# Patient Record
Sex: MALE | Race: WHITE | NOT HISPANIC OR LATINO
[De-identification: names, ages, dates, MRNs, and addresses within clinical notes are randomized per-mention and may not be internally consistent; named-entity substitution may affect disease eponyms.]

---

## 2017-01-04 ENCOUNTER — PRIOR ORIGINAL RECORDS (OUTPATIENT)
Dept: OTHER | Age: 62
End: 2017-01-04

## 2017-01-09 ENCOUNTER — HOSPITAL ENCOUNTER (OUTPATIENT)
Dept: CV DIAGNOSTICS | Facility: HOSPITAL | Age: 62
Discharge: HOME | End: 2017-01-09
Attending: INTERNAL MEDICINE
Payer: COMMERCIAL

## 2017-01-09 DIAGNOSIS — I48.91 A-FIB (HCC): ICD-10-CM

## 2017-01-09 PROCEDURE — 93226 XTRNL ECG REC<48 HR SCAN A/R: CPT

## 2017-01-09 PROCEDURE — 93225 XTRNL ECG REC<48 HRS REC: CPT

## 2017-01-09 PROCEDURE — 93227 XTRNL ECG REC<48 HR R&I: CPT | Performed by: INTERNAL MEDICINE

## 2017-01-16 ENCOUNTER — APPOINTMENT (OUTPATIENT)
Dept: GENERAL RADIOLOGY | Facility: HOSPITAL | Age: 62
End: 2017-01-16
Attending: EMERGENCY MEDICINE
Payer: COMMERCIAL

## 2017-01-16 ENCOUNTER — HOSPITAL ENCOUNTER (EMERGENCY)
Facility: HOSPITAL | Age: 62
Discharge: HOME | End: 2017-01-16
Attending: EMERGENCY MEDICINE
Payer: COMMERCIAL

## 2017-01-16 ENCOUNTER — PRIOR ORIGINAL RECORDS (OUTPATIENT)
Dept: OTHER | Age: 62
End: 2017-01-16

## 2017-01-16 VITALS
DIASTOLIC BLOOD PRESSURE: 68 MMHG | HEART RATE: 55 BPM | TEMPERATURE: 98 F | SYSTOLIC BLOOD PRESSURE: 114 MMHG | BODY MASS INDEX: 25.33 KG/M2 | OXYGEN SATURATION: 97 % | HEIGHT: 76 IN | WEIGHT: 208 LBS | RESPIRATION RATE: 16 BRPM

## 2017-01-16 DIAGNOSIS — E83.39 HYPOPHOSPHATEMIA: Primary | ICD-10-CM

## 2017-01-16 DIAGNOSIS — R53.1 WEAKNESS GENERALIZED: ICD-10-CM

## 2017-01-16 LAB
ALBUMIN SERPL-MCNC: 3.9 G/DL (ref 3.5–4.8)
ALP LIVER SERPL-CCNC: 74 U/L (ref 45–117)
ALT SERPL-CCNC: 25 U/L (ref 17–63)
AST SERPL-CCNC: 19 U/L (ref 15–41)
BASOPHILS # BLD AUTO: 0.05 X10(3) UL (ref 0–0.1)
BASOPHILS NFR BLD AUTO: 0.8 %
BILIRUB SERPL-MCNC: 0.5 MG/DL (ref 0.1–2)
BILIRUB UR QL STRIP.AUTO: NEGATIVE
BUN BLD-MCNC: 23 MG/DL (ref 8–20)
CALCIUM BLD-MCNC: 9 MG/DL (ref 8.3–10.3)
CHLORIDE: 107 MMOL/L (ref 101–111)
CLARITY UR REFRACT.AUTO: CLEAR
CO2: 24 MMOL/L (ref 22–32)
COLOR UR AUTO: YELLOW
CREAT BLD-MCNC: 1.27 MG/DL (ref 0.7–1.3)
EOSINOPHIL # BLD AUTO: 0.13 X10(3) UL (ref 0–0.3)
EOSINOPHIL NFR BLD AUTO: 2.1 %
ERYTHROCYTE [DISTWIDTH] IN BLOOD BY AUTOMATED COUNT: 12.2 % (ref 11.5–16)
GLUCOSE BLD-MCNC: 96 MG/DL (ref 70–99)
GLUCOSE UR STRIP.AUTO-MCNC: NEGATIVE MG/DL
HAV IGM SER QL: 2.2 MG/DL (ref 1.7–3)
HCT VFR BLD AUTO: 44.4 % (ref 37–53)
HGB BLD-MCNC: 16 G/DL (ref 13–17)
IMMATURE GRANULOCYTE COUNT: 0.03 X10(3) UL (ref 0–1)
IMMATURE GRANULOCYTE RATIO %: 0.5 %
KETONES UR STRIP.AUTO-MCNC: NEGATIVE MG/DL
LEUKOCYTE ESTERASE UR QL STRIP.AUTO: NEGATIVE
LYMPHOCYTES # BLD AUTO: 1.8 X10(3) UL (ref 0.9–4)
LYMPHOCYTES NFR BLD AUTO: 29.5 %
M PROTEIN MFR SERPL ELPH: 6.7 G/DL (ref 6.1–8.3)
MCH RBC QN AUTO: 33.8 PG (ref 27–33.2)
MCHC RBC AUTO-ENTMCNC: 36 G/DL (ref 31–37)
MCV RBC AUTO: 93.9 FL (ref 80–99)
MONOCYTES # BLD AUTO: 0.44 X10(3) UL (ref 0.1–0.6)
MONOCYTES NFR BLD AUTO: 7.2 %
NEUTROPHIL ABS PRELIM: 3.65 X10 (3) UL (ref 1.3–6.7)
NEUTROPHILS # BLD AUTO: 3.65 X10(3) UL (ref 1.3–6.7)
NEUTROPHILS NFR BLD AUTO: 59.9 %
NITRITE UR QL STRIP.AUTO: NEGATIVE
PH UR STRIP.AUTO: 7 [PH] (ref 4.5–8)
PHOSPHATE SERPL-MCNC: 2.3 MG/DL (ref 2.5–4.9)
PLATELET # BLD AUTO: 184 10(3)UL (ref 150–450)
POTASSIUM SERPL-SCNC: 4 MMOL/L (ref 3.6–5.1)
PROT UR STRIP.AUTO-MCNC: NEGATIVE MG/DL
RBC # BLD AUTO: 4.73 X10(6)UL (ref 4.3–5.7)
RBC UR QL AUTO: NEGATIVE
RED CELL DISTRIBUTION WIDTH-SD: 42.3 FL (ref 35.1–46.3)
SODIUM SERPL-SCNC: 141 MMOL/L (ref 136–144)
SP GR UR STRIP.AUTO: 1.01 (ref 1–1.03)
TROPONIN: <0.046 NG/ML (ref ?–0.05)
TSI SER-ACNC: 3.83 MIU/ML (ref 0.35–5.5)
UROBILINOGEN UR STRIP.AUTO-MCNC: <2 MG/DL
WBC # BLD AUTO: 6.1 X10(3) UL (ref 4–13)

## 2017-01-16 PROCEDURE — 93010 ELECTROCARDIOGRAM REPORT: CPT

## 2017-01-16 PROCEDURE — 96360 HYDRATION IV INFUSION INIT: CPT

## 2017-01-16 PROCEDURE — 83735 ASSAY OF MAGNESIUM: CPT | Performed by: EMERGENCY MEDICINE

## 2017-01-16 PROCEDURE — 84100 ASSAY OF PHOSPHORUS: CPT | Performed by: EMERGENCY MEDICINE

## 2017-01-16 PROCEDURE — 81003 URINALYSIS AUTO W/O SCOPE: CPT | Performed by: EMERGENCY MEDICINE

## 2017-01-16 PROCEDURE — 85025 COMPLETE CBC W/AUTO DIFF WBC: CPT

## 2017-01-16 PROCEDURE — 84484 ASSAY OF TROPONIN QUANT: CPT

## 2017-01-16 PROCEDURE — 99285 EMERGENCY DEPT VISIT HI MDM: CPT

## 2017-01-16 PROCEDURE — 80053 COMPREHEN METABOLIC PANEL: CPT

## 2017-01-16 PROCEDURE — 93005 ELECTROCARDIOGRAM TRACING: CPT

## 2017-01-16 PROCEDURE — 84443 ASSAY THYROID STIM HORMONE: CPT | Performed by: EMERGENCY MEDICINE

## 2017-01-16 PROCEDURE — 71010 XR CHEST AP PORTABLE  (CPT=71010): CPT

## 2017-01-17 ENCOUNTER — OFFICE VISIT (OUTPATIENT)
Dept: INTERNAL MEDICINE CLINIC | Facility: CLINIC | Age: 62
End: 2017-01-17

## 2017-01-17 ENCOUNTER — PRIOR ORIGINAL RECORDS (OUTPATIENT)
Dept: OTHER | Age: 62
End: 2017-01-17

## 2017-01-17 VITALS
HEIGHT: 75 IN | DIASTOLIC BLOOD PRESSURE: 60 MMHG | HEART RATE: 57 BPM | SYSTOLIC BLOOD PRESSURE: 104 MMHG | TEMPERATURE: 98 F | RESPIRATION RATE: 16 BRPM | BODY MASS INDEX: 27.1 KG/M2 | WEIGHT: 218 LBS | OXYGEN SATURATION: 98 %

## 2017-01-17 DIAGNOSIS — R53.1 GENERALIZED WEAKNESS: ICD-10-CM

## 2017-01-17 DIAGNOSIS — I48.0 PAROXYSMAL A-FIB (HCC): Primary | ICD-10-CM

## 2017-01-17 DIAGNOSIS — F41.9 ANXIETY: ICD-10-CM

## 2017-01-17 DIAGNOSIS — Z82.49 FAMILY HISTORY OF ABDOMINAL AORTIC ANEURYSM (AAA): ICD-10-CM

## 2017-01-17 LAB
ATRIAL RATE: 57 BPM
P AXIS: 53 DEGREES
P-R INTERVAL: 160 MS
Q-T INTERVAL: 402 MS
QRS DURATION: 88 MS
QTC CALCULATION (BEZET): 391 MS
R AXIS: 90 DEGREES
T AXIS: 66 DEGREES
VENTRICULAR RATE: 57 BPM

## 2017-01-17 PROCEDURE — 99214 OFFICE O/P EST MOD 30 MIN: CPT | Performed by: PHYSICIAN ASSISTANT

## 2017-01-17 RX ORDER — ALPRAZOLAM 0.25 MG/1
0.25 TABLET ORAL 2 TIMES DAILY PRN
Qty: 30 TABLET | Refills: 0 | Status: SHIPPED | OUTPATIENT
Start: 2017-01-17 | End: 2017-04-11 | Stop reason: ALTCHOICE

## 2017-01-17 NOTE — ED PROVIDER NOTES
Patient Seen in: [facility] Emergency Department    History   Patient presents with:  Fatigue (constitutional, neurologic)    Stated Complaint: fatigue/weakness    HPI    Patient is a 42-year-old male comes in to emergency room for evaluation of gen 3/2013   • Elevated prostate specific antigen (PSA) 2011   • History of COPD    • Cholelithiasis 5/2011   • Diverticulosis 5/2011   • Renal cyst 5/2011     tiny Lt upper pole   • Splenic cyst 5/2011     lower pole splenic cyst   • Tendinopathy of left rota • Arthritis Sister      Rheumatoid     • Breast Cancer Maternal Aunt 39   • Breast Cancer Other      maternal uncle's daughter - early 52's, unlateral mastectomy         Smoking Status: Never Smoker                      Alcohol Use: Yes           0.0 oz/ extremities. No pronator drift lower extremities.     ED Course     Labs Reviewed   COMP METABOLIC PANEL (14) - Abnormal; Notable for the following:     BUN 23 (*)     All other components within normal limits   PHOSPHORUS - Abnormal; Notable for the follo evidence clinically for stroke. Patient was screened and evaluated during this visit.    As a treating physician attending to the patient, I determined, within reasonable clinical confidence and prior to discharge, that an emergency medical condition was n

## 2017-01-17 NOTE — PROGRESS NOTES
HPI:    Patient ID: Brittany Hudson is a 60year old male. HPI  Pt has had 2 episodes in the past week of dizziness/weakness, feels like 'he doesn't want to stand up'. No syncope. Episodes occurred 1/12 and 1/16.  They occurred while at rest, in the e times daily. Disp:  Rfl:    aspirin 81 MG Oral Tab Take 81 mg by mouth daily. Disp:  Rfl:    Multiple Vitamin (DAILY MARTÍNEZ) Oral Tab Take 1 tablet by mouth daily.  Disp:  Rfl:      Allergies:No Known Allergies   PHYSICAL EXAM:   Physical Exam   Constitutio This Visit:  Signed Prescriptions Disp Refills    alprazolam 0.25 MG Oral Tab 30 tablet 0      Sig: Take 1 tablet (0.25 mg total) by mouth 2 (two) times daily as needed for Anxiety.            Imaging & Referrals:  None       IF#[id number]

## 2017-01-17 NOTE — PATIENT INSTRUCTIONS
Check with Dr. Oj Christina if he would recommend further workup for your symptoms at this time  Take alprazolam, 1/2 to 1 tablet, as needed for anxiety

## 2017-01-17 NOTE — ED INITIAL ASSESSMENT (HPI)
Pt with reports of not feeling right for past few days. Denies specific pain, hx of afib. Denies shortness of breath, recent illness.

## 2017-02-06 ENCOUNTER — PRIOR ORIGINAL RECORDS (OUTPATIENT)
Dept: OTHER | Age: 62
End: 2017-02-06

## 2017-02-06 ENCOUNTER — OFFICE VISIT (OUTPATIENT)
Dept: INTERNAL MEDICINE CLINIC | Facility: CLINIC | Age: 62
End: 2017-02-06

## 2017-02-06 VITALS
DIASTOLIC BLOOD PRESSURE: 62 MMHG | BODY MASS INDEX: 27.35 KG/M2 | WEIGHT: 220 LBS | HEART RATE: 64 BPM | RESPIRATION RATE: 12 BRPM | HEIGHT: 75 IN | SYSTOLIC BLOOD PRESSURE: 106 MMHG | TEMPERATURE: 98 F

## 2017-02-06 DIAGNOSIS — G89.29 CHRONIC LEFT SI JOINT PAIN: Primary | ICD-10-CM

## 2017-02-06 DIAGNOSIS — M53.3 CHRONIC LEFT SI JOINT PAIN: Primary | ICD-10-CM

## 2017-02-06 PROCEDURE — 99213 OFFICE O/P EST LOW 20 MIN: CPT | Performed by: PHYSICIAN ASSISTANT

## 2017-02-06 RX ORDER — METHYLPREDNISOLONE 4 MG/1
TABLET ORAL
Qty: 1 PACKAGE | Refills: 0 | Status: SHIPPED | OUTPATIENT
Start: 2017-02-06 | End: 2017-03-25 | Stop reason: ALTCHOICE

## 2017-02-06 RX ORDER — CYCLOBENZAPRINE HCL 10 MG
10 TABLET ORAL 2 TIMES DAILY PRN
Qty: 20 TABLET | Refills: 0 | Status: SHIPPED | OUTPATIENT
Start: 2017-02-06 | End: 2017-04-11 | Stop reason: ALTCHOICE

## 2017-02-06 NOTE — PROGRESS NOTES
HPI:   Veena Flores is a 60year old male who is here for complaints of back pain. Pain is located at left low back. Pain is described as dull, aching, shooting. Severity is moderate. The pain radiates to no radiation of pain.  Pain was precipitated granuloma of skin and subcutaneous tissue 2/2003     Lt neck   • Toe fracture, right 1/1999     fracture of lateral aspect of base of distal phalanges of Rt great toe   • Internal hemorrhoids 5/2004   • Sleep apnea            Past Surgical History    APPEN nourished,in no apparent distress  SKIN: no rashes,no suspicious lesions  NECK: supple,no adenopathy,no bruits  LUNGS: clear to auscultation  CARDIO: RRR without murmur  GI: good BS's,no masses, HSM or tenderness  EXTREMITIES: no cyanosis, clubbing or grisel

## 2017-02-10 ENCOUNTER — MED REC SCAN ONLY (OUTPATIENT)
Dept: INTERNAL MEDICINE CLINIC | Facility: CLINIC | Age: 62
End: 2017-02-10

## 2017-02-15 ENCOUNTER — HOSPITAL ENCOUNTER (OUTPATIENT)
Dept: CV DIAGNOSTICS | Facility: HOSPITAL | Age: 62
Discharge: HOME | End: 2017-02-15
Attending: INTERNAL MEDICINE
Payer: COMMERCIAL

## 2017-02-15 DIAGNOSIS — I48.91 A-FIB (HCC): ICD-10-CM

## 2017-02-15 PROCEDURE — 93017 CV STRESS TEST TRACING ONLY: CPT

## 2017-02-15 PROCEDURE — 93350 STRESS TTE ONLY: CPT | Performed by: INTERNAL MEDICINE

## 2017-02-15 PROCEDURE — 93350 STRESS TTE ONLY: CPT

## 2017-02-15 PROCEDURE — 93018 CV STRESS TEST I&R ONLY: CPT | Performed by: INTERNAL MEDICINE

## 2017-02-17 ENCOUNTER — PRIOR ORIGINAL RECORDS (OUTPATIENT)
Dept: OTHER | Age: 62
End: 2017-02-17

## 2017-03-06 LAB
HEMATOCRIT: 44.4 %
HEMOGLOBIN: 16 G/DL
MCH: 33.8 PG
MCHC: 36 G/DL
MCV: 93.9 FL
PLATELETS: 184 K/UL
RED BLOOD COUNT: 4.73 X 10-6/U
WHITE BLOOD COUNT: 6.1 X 10-3/U

## 2017-03-07 LAB
ALBUMIN: 3.9 G/DL
BILIRUBIN TOTAL: 0.5 MG/DL
BUN: 23 MG/DL
CALCIUM: 9 MG/DL
CHLORIDE: 107 MEQ/L
CREATININE, SERUM: 1.27 MG/DL
GLUCOSE: 96 MG/DL
MAGNESIUM: 2.2 MG/DL
POTASSIUM, SERUM: 4 MEQ/L
PROTEIN, TOTAL: 6.7 G/DL
SGOT (AST): 19 IU/L
SGPT (ALT): 25 IU/L
SODIUM: 141 MEQ/L

## 2017-03-17 ENCOUNTER — PRIOR ORIGINAL RECORDS (OUTPATIENT)
Dept: OTHER | Age: 62
End: 2017-03-17

## 2017-03-25 ENCOUNTER — OFFICE VISIT (OUTPATIENT)
Dept: INTERNAL MEDICINE CLINIC | Facility: CLINIC | Age: 62
End: 2017-03-25

## 2017-03-25 VITALS
BODY MASS INDEX: 26.49 KG/M2 | TEMPERATURE: 98 F | WEIGHT: 213 LBS | SYSTOLIC BLOOD PRESSURE: 100 MMHG | HEIGHT: 75 IN | DIASTOLIC BLOOD PRESSURE: 60 MMHG | RESPIRATION RATE: 12 BRPM | HEART RATE: 72 BPM

## 2017-03-25 DIAGNOSIS — M53.3 CHRONIC LEFT SI JOINT PAIN: ICD-10-CM

## 2017-03-25 DIAGNOSIS — M62.830 LUMBAR PARASPINAL MUSCLE SPASM: Primary | ICD-10-CM

## 2017-03-25 DIAGNOSIS — G89.29 CHRONIC LEFT SI JOINT PAIN: ICD-10-CM

## 2017-03-25 PROCEDURE — 99213 OFFICE O/P EST LOW 20 MIN: CPT | Performed by: PHYSICIAN ASSISTANT

## 2017-03-25 RX ORDER — PANTOPRAZOLE SODIUM 40 MG/1
40 TABLET, DELAYED RELEASE ORAL
COMMUNITY
Start: 2017-03-24 | End: 2017-06-13

## 2017-03-25 NOTE — PROGRESS NOTES
HPI:   Puma Michel is a 60year old male who is here for complaints of back pain, recurrent. Pain is located at left low back. Pain is described as dull, aching, shooting. Severity is mild. The pain radiates to no radiation of pain.  Pain was precip Tendinopathy of left rotator cuff 2007   • Thoracic outlet syndrome 2009   • Environmental allergies      pollen, dust, trees, grass   • Chronic hypotension 11/2011     typically asymptomatic   • EIC (epidermal inclusion cyst) 2003     L upper back s/p exc lesions  LUNGS: denies shortness of breath with exertion  CARDIOVASCULAR: denies chest pain on exertion  GI: denies abdominal pain,denies heartburn  MUSCULOSKELETAL: no other joints are affected    EXAM:   /60 mmHg  Pulse 72  Temp(Src) 97.8 °F (36.6

## 2017-03-26 PROBLEM — G89.29 CHRONIC LEFT SI JOINT PAIN: Status: ACTIVE | Noted: 2017-03-26

## 2017-03-26 PROBLEM — M53.3 CHRONIC LEFT SI JOINT PAIN: Status: ACTIVE | Noted: 2017-03-26

## 2017-03-26 NOTE — PATIENT INSTRUCTIONS
Continue physical therapy  Take cyclobenzaprine if needed for muscle spasm  We will plan to check imaging on your back if not resolved within the next few weeks.

## 2017-04-05 ENCOUNTER — PRIOR ORIGINAL RECORDS (OUTPATIENT)
Dept: OTHER | Age: 62
End: 2017-04-05

## 2017-04-06 ENCOUNTER — HOSPITAL ENCOUNTER (OUTPATIENT)
Dept: CT IMAGING | Facility: HOSPITAL | Age: 62
Discharge: HOME | End: 2017-04-06
Attending: INTERNAL MEDICINE
Payer: COMMERCIAL

## 2017-04-06 DIAGNOSIS — I48.92 ATRIAL FLUTTER (HCC): ICD-10-CM

## 2017-04-06 DIAGNOSIS — I48.91 AFIB (HCC): ICD-10-CM

## 2017-04-06 PROCEDURE — 75572 CT HRT W/3D IMAGE: CPT

## 2017-04-09 ENCOUNTER — HOSPITAL ENCOUNTER (EMERGENCY)
Facility: HOSPITAL | Age: 62
Discharge: HOME | End: 2017-04-09
Attending: EMERGENCY MEDICINE
Payer: COMMERCIAL

## 2017-04-09 ENCOUNTER — PRIOR ORIGINAL RECORDS (OUTPATIENT)
Dept: OTHER | Age: 62
End: 2017-04-09

## 2017-04-09 VITALS
HEART RATE: 56 BPM | DIASTOLIC BLOOD PRESSURE: 63 MMHG | TEMPERATURE: 98 F | WEIGHT: 205 LBS | HEIGHT: 76 IN | RESPIRATION RATE: 18 BRPM | BODY MASS INDEX: 24.96 KG/M2 | OXYGEN SATURATION: 97 % | SYSTOLIC BLOOD PRESSURE: 102 MMHG

## 2017-04-09 DIAGNOSIS — I48.91 ATRIAL FIBRILLATION WITH RAPID VENTRICULAR RESPONSE (HCC): Primary | ICD-10-CM

## 2017-04-09 PROCEDURE — 92960 CARDIOVERSION ELECTRIC EXT: CPT

## 2017-04-09 PROCEDURE — 93005 ELECTROCARDIOGRAM TRACING: CPT

## 2017-04-09 PROCEDURE — 93010 ELECTROCARDIOGRAM REPORT: CPT

## 2017-04-09 PROCEDURE — 80053 COMPREHEN METABOLIC PANEL: CPT | Performed by: EMERGENCY MEDICINE

## 2017-04-09 PROCEDURE — 99152 MOD SED SAME PHYS/QHP 5/>YRS: CPT

## 2017-04-09 PROCEDURE — 99285 EMERGENCY DEPT VISIT HI MDM: CPT

## 2017-04-09 PROCEDURE — 99291 CRITICAL CARE FIRST HOUR: CPT

## 2017-04-09 PROCEDURE — 83735 ASSAY OF MAGNESIUM: CPT | Performed by: EMERGENCY MEDICINE

## 2017-04-09 PROCEDURE — 85025 COMPLETE CBC W/AUTO DIFF WBC: CPT | Performed by: EMERGENCY MEDICINE

## 2017-04-09 PROCEDURE — 84484 ASSAY OF TROPONIN QUANT: CPT | Performed by: EMERGENCY MEDICINE

## 2017-04-09 NOTE — ED INITIAL ASSESSMENT (HPI)
Pt here for irregular HR, the sensation waking him up at midnight.  (Pt has hx of afib; he's also scheduled for an ablation on 4/13.)

## 2017-04-09 NOTE — ED PROVIDER NOTES
Patient Seen in: [facility] Emergency Department    History   Patient presents with:  Arrythmia/Palpitations (cardiovascular)    Stated Complaint: possible afib    HPI    Patient is a 13-year-old male comes in emergency room for evaluation of palpit to cecum    OTHER SURGICAL HISTORY  6/29/2011    Comment robotic-assisted laparoscopic raidcal prostatectomy & B/L pelvic lymph node dissection    ELECTROCARDIOGRAM, COMPLETE  12/6/2013    CARDIOVERSION  12/2011    OTHER SURGICAL HISTORY  3/6/2003    Comme agreed except as otherwise stated in HPI.     Physical Exam     ED Triage Vitals   BP 04/09/17 0105 90/61 mmHg   Pulse 04/09/17 0105 124   Resp 04/09/17 0105 18   Temp 04/09/17 0105 97.8 °F (36.6 °C)   Temp src 04/09/17 0105 Temporal   SpO2 04/09/17 0105 97 Status                     ---------                               -----------         ------                     CBC W/ DIFFERENTIAL[[id number]]          Abnormal            Final result                 Please view results for these tests on the individu visit.     Follow-up:  Lanre Chaparro MD  [address] [id number]  [phone]    In 4 days        Medications Prescribed:  Current Discharge Medication List

## 2017-04-09 NOTE — ED NOTES
Walked patient around unit, no compliant of dizziness or lightheaded. Felt better and felt good to go home.

## 2017-04-10 NOTE — HISTORICAL OFFICE NOTE
Juan Dexter  : 1955  ACCOUNT:  [id number]  [phone]  PCP: Dr. Tamica Bragg     TODAY'S DATE: 2017  DICTATED BY:  Skye Mattson M.D. ]    CHIEF COMPLAINT: [Followup of Atrial fibrillation.]    HPI:    [On 2017, angie Garvey NEURO: no slurred speech, seizures, or localized weakness. HEM/LYMPH: denies easy bruising. ALL: no new food or enviornmental allergies.       PAST HISTORY: COPD, Hx prostate cancer, appendectomy and prostatectomy    PAST CV HISTORY: atrial fibrillation, at life-threatening complication. After reviewing these details, he would like to talk it over with his wife, consider scheduling something towards the end of March, beginning of April.     PLAN:    [1. Schedule EP study and cryoablation for atrial fibrillatio

## 2017-04-12 ENCOUNTER — PRIOR ORIGINAL RECORDS (OUTPATIENT)
Dept: OTHER | Age: 62
End: 2017-04-12

## 2017-04-13 ENCOUNTER — HOSPITAL ENCOUNTER (OUTPATIENT)
Dept: INTERVENTIONAL RADIOLOGY/VASCULAR | Facility: HOSPITAL | Age: 62
Setting detail: OBSERVATION
Discharge: HOME | End: 2017-04-14
Attending: INTERNAL MEDICINE | Admitting: INTERNAL MEDICINE
Payer: COMMERCIAL

## 2017-04-13 DIAGNOSIS — I48.91 A-FIB (HCC): ICD-10-CM

## 2017-04-13 DIAGNOSIS — I48.92 ATRIAL FLUTTER (HCC): ICD-10-CM

## 2017-04-13 PROCEDURE — 93662 INTRACARDIAC ECG (ICE): CPT | Performed by: INTERNAL MEDICINE

## 2017-04-13 PROCEDURE — 4A023FZ MEASUREMENT OF CARDIAC RHYTHM, PERCUTANEOUS APPROACH: ICD-10-PCS | Performed by: INTERNAL MEDICINE

## 2017-04-13 PROCEDURE — 85347 COAGULATION TIME ACTIVATED: CPT

## 2017-04-13 PROCEDURE — 99152 MOD SED SAME PHYS/QHP 5/>YRS: CPT | Performed by: INTERNAL MEDICINE

## 2017-04-13 PROCEDURE — 84132 ASSAY OF SERUM POTASSIUM: CPT | Performed by: NURSE PRACTITIONER

## 2017-04-13 PROCEDURE — 93656 COMPRE EP EVAL ABLTJ ATR FIB: CPT | Performed by: INTERNAL MEDICINE

## 2017-04-13 PROCEDURE — 93613 INTRACARDIAC EPHYS 3D MAPG: CPT | Performed by: INTERNAL MEDICINE

## 2017-04-13 PROCEDURE — B24CZZZ ULTRASONOGRAPHY OF PERICARDIUM: ICD-10-PCS | Performed by: INTERNAL MEDICINE

## 2017-04-13 PROCEDURE — 4A0234Z MEASUREMENT OF CARDIAC ELECTRICAL ACTIVITY, PERCUTANEOUS APPROACH: ICD-10-PCS | Performed by: INTERNAL MEDICINE

## 2017-04-13 PROCEDURE — 99153 MOD SED SAME PHYS/QHP EA: CPT | Performed by: INTERNAL MEDICINE

## 2017-04-13 PROCEDURE — 93010 ELECTROCARDIOGRAM REPORT: CPT | Performed by: INTERNAL MEDICINE

## 2017-04-13 PROCEDURE — 02583ZZ DESTRUCTION OF CONDUCTION MECHANISM, PERCUTANEOUS APPROACH: ICD-10-PCS | Performed by: INTERNAL MEDICINE

## 2017-04-13 PROCEDURE — 93005 ELECTROCARDIOGRAM TRACING: CPT

## 2017-04-13 PROCEDURE — 93655 ICAR CATH ABLTJ DSCRT ARRHYT: CPT | Performed by: INTERNAL MEDICINE

## 2017-04-13 PROCEDURE — 02K83ZZ MAP CONDUCTION MECHANISM, PERCUTANEOUS APPROACH: ICD-10-PCS | Performed by: INTERNAL MEDICINE

## 2017-04-13 RX ORDER — HEPARIN SODIUM 5000 [USP'U]/ML
INJECTION, SOLUTION INTRAVENOUS; SUBCUTANEOUS
Status: COMPLETED
Start: 2017-04-13 | End: 2017-04-13

## 2017-04-13 RX ORDER — MIDAZOLAM HYDROCHLORIDE 1 MG/ML
INJECTION INTRAMUSCULAR; INTRAVENOUS
Status: COMPLETED
Start: 2017-04-13 | End: 2017-04-13

## 2017-04-13 RX ORDER — PANTOPRAZOLE SODIUM 40 MG/1
40 TABLET, DELAYED RELEASE ORAL
Status: DISCONTINUED | OUTPATIENT
Start: 2017-04-14 | End: 2017-04-14

## 2017-04-13 RX ORDER — ATORVASTATIN CALCIUM 20 MG/1
20 TABLET, FILM COATED ORAL NIGHTLY
Status: DISCONTINUED | OUTPATIENT
Start: 2017-04-13 | End: 2017-04-14

## 2017-04-13 RX ORDER — ATORVASTATIN CALCIUM 20 MG/1
20 TABLET, FILM COATED ORAL NIGHTLY
COMMUNITY

## 2017-04-13 RX ORDER — SODIUM CHLORIDE 9 MG/ML
INJECTION, SOLUTION INTRAVENOUS CONTINUOUS
Status: CANCELLED | OUTPATIENT
Start: 2017-04-13

## 2017-04-13 RX ORDER — ASPIRIN 325 MG
325 TABLET, DELAYED RELEASE (ENTERIC COATED) ORAL DAILY
Status: CANCELLED | OUTPATIENT
Start: 2017-04-13

## 2017-04-13 RX ORDER — HEPARIN SODIUM 1000 [USP'U]/ML
INJECTION, SOLUTION INTRAVENOUS; SUBCUTANEOUS
Status: COMPLETED
Start: 2017-04-13 | End: 2017-04-13

## 2017-04-13 RX ORDER — PANTOPRAZOLE SODIUM 40 MG/1
40 TABLET, DELAYED RELEASE ORAL
Status: CANCELLED | OUTPATIENT
Start: 2017-04-13

## 2017-04-13 RX ORDER — PROTAMINE SULFATE 10 MG/ML
INJECTION, SOLUTION INTRAVENOUS
Status: COMPLETED
Start: 2017-04-13 | End: 2017-04-13

## 2017-04-13 RX ORDER — ASPIRIN 81 MG/1
81 TABLET ORAL DAILY
Status: DISCONTINUED | OUTPATIENT
Start: 2017-04-14 | End: 2017-04-14

## 2017-04-13 RX ORDER — LIDOCAINE HYDROCHLORIDE 10 MG/ML
INJECTION, SOLUTION INFILTRATION; PERINEURAL
Status: COMPLETED
Start: 2017-04-13 | End: 2017-04-13

## 2017-04-13 RX ADMIN — ATORVASTATIN CALCIUM 20 MG: 20 TABLET, FILM COATED ORAL at 20:04:00

## 2017-04-13 NOTE — PROCEDURES
[facility]   Procedure Note    Trace Dunlap Location: Cath Lab   CSN [id number] MRN [id number]   Admission Date 4/13/2017 Operation Date 4/13/2017   Attending Physician Jeanette Vieyra MD Operating Physician Irasema Ye MD     Pre-Operative D and flushed appropriately. A Brockenbrough 1 needle was then inserted into the SL-1 sheath and dilator. The needle was not advanced beyond the tip of the dilator.  The sheath, dilator  and needle were withdrawn in the 5 oclock position until tenting was c as well. Using an open irrigated RFA catheter, a linear set of lesions was delivered   in the cavotricuspid isthmus. RFA in this area was performed for a separate and   distinct arrythmia (atrial flutter).  RFA was delivered until bidirectional block was of   the procedure, confirmed by intracardiac echocardiography.     Complications:  None    Gabe Youssef MD  4/13/2017  3:49 PM

## 2017-04-13 NOTE — PROGRESS NOTES
04/13/17 1538   Clinical Encounter Type   Referral From Patient;Nurse   Referral To ([facility]  for [employer] as per the consult.)

## 2017-04-13 NOTE — H&P
[facility]  H&P    Selin Pearson Patient Status:  Outpatient in a Bed    1/3/1955 MRN [id number]   [facility] 2NE-A Attending Natalie Roy MD   Hosp Day # 0 PCP Michele Aleman MD     Reason for Ad Past Surgical History    APPENDECTOMY  8/1991    COLONOSCOPY  5/7/2004    Comment to cecum    OTHER SURGICAL HISTORY  6/29/2011    Comment robotic-assisted laparoscopic raidcal prostatectomy & B/L pelvic lymph node dissection    ELECTROCARDIOGRAM, (36.6 °C), Min:97.9 °F (36.6 °C), Max:97.9 °F (36.6 °C)    Wt Readings from Last 3 Encounters:  04/09/17 : 205 lb  03/25/17 : 213 lb  02/06/17 : 220 lb      Telemetry: SR  General: Alert and oriented in no apparent distress. HEENT: No focal deficits.   Nec

## 2017-04-13 NOTE — PROGRESS NOTES
04/13/17 3152   Clinical Encounter Type   Visited With Patient   Routine Visit Introduction   Patient's Supportive Strategies/Resources  provided introductory visit.     Restorationist Encounters   Spiritual Requests During Visit / Hospitalization Cat

## 2017-04-14 VITALS
SYSTOLIC BLOOD PRESSURE: 107 MMHG | DIASTOLIC BLOOD PRESSURE: 61 MMHG | RESPIRATION RATE: 16 BRPM | TEMPERATURE: 97 F | OXYGEN SATURATION: 98 % | HEART RATE: 67 BPM

## 2017-04-14 RX ORDER — ACETAMINOPHEN 325 MG/1
650 TABLET ORAL EVERY 6 HOURS PRN
Status: DISCONTINUED | OUTPATIENT
Start: 2017-04-14 | End: 2017-04-14

## 2017-04-14 RX ADMIN — ASPIRIN 81 MG: 81 TABLET ORAL at 08:23:00

## 2017-04-14 RX ADMIN — ACETAMINOPHEN 650 MG: 325 TABLET ORAL at 04:04:00

## 2017-04-14 RX ADMIN — PANTOPRAZOLE SODIUM 40 MG: 40 TABLET, DELAYED RELEASE ORAL at 04:05:00

## 2017-04-14 NOTE — PROGRESS NOTES
[facility]  Cardiology Progress Note    Maria C Cortez Patient Status:  Outpatient in a Bed    1/3/1955 MRN [id number]   [facility] 2NE-A Attending Yolanda Pinto MD   Hosp Day # 1 PCP Mana Judd MD     Subjective:  No complai

## 2017-04-14 NOTE — PAYOR COMM NOTE
Attending Physician: Tiffanie Fournier MD    Review Type: ADMISSION   Reviewer: Ronal Lu       Date: April 14, 2017 - 8:47 AM  Payor: [employer]  Authorization Number: [id number]  Admit date: 4/13/2017  6:59 AM       REVIEWER COMMENTS  Chief Complaint: limits   POTASSIUM - Normal

## 2017-04-14 NOTE — PROGRESS NOTES
D/c to  Home in stable condition  Tele and iv removed without any complication  No c/o pain  bilateral groins remain c/d/i  All d/c medications including meds, follow up care and groin care reviewed with pt and spouse- all questions answered

## 2017-04-14 NOTE — CM/SW NOTE
04/14/17 0900   CM/SW Referral Data   Referral Source Physician;Nurse   Reason for Referral Discharge planning   Informant Patient   Pertinent Medical Hx   Primary Care Physician Name Dr. Molly Cheadle   Patient Info   Patient's Mental Status Alert;Orien

## 2017-04-17 NOTE — CM/SW NOTE
04/17/17 0800   Discharge disposition   Discharged to: Home or [address] [place] [address] services after discharge None   Discharge transportation Private car   Patient discharged 4/14/17

## 2017-05-02 ENCOUNTER — MYAURORA ACCOUNT LINK (OUTPATIENT)
Dept: OTHER | Age: 62
End: 2017-05-02

## 2017-05-02 ENCOUNTER — PRIOR ORIGINAL RECORDS (OUTPATIENT)
Dept: OTHER | Age: 62
End: 2017-05-02

## 2017-05-03 ENCOUNTER — APPOINTMENT (OUTPATIENT)
Dept: CT IMAGING | Facility: HOSPITAL | Age: 62
End: 2017-05-03
Attending: EMERGENCY MEDICINE
Payer: COMMERCIAL

## 2017-05-03 ENCOUNTER — HOSPITAL ENCOUNTER (EMERGENCY)
Facility: HOSPITAL | Age: 62
Discharge: HOME | End: 2017-05-03
Attending: EMERGENCY MEDICINE
Payer: COMMERCIAL

## 2017-05-03 ENCOUNTER — APPOINTMENT (OUTPATIENT)
Dept: GENERAL RADIOLOGY | Facility: HOSPITAL | Age: 62
End: 2017-05-03
Attending: EMERGENCY MEDICINE
Payer: COMMERCIAL

## 2017-05-03 VITALS
DIASTOLIC BLOOD PRESSURE: 60 MMHG | SYSTOLIC BLOOD PRESSURE: 97 MMHG | TEMPERATURE: 98 F | RESPIRATION RATE: 14 BRPM | HEART RATE: 68 BPM | WEIGHT: 205 LBS | HEIGHT: 76 IN | BODY MASS INDEX: 24.96 KG/M2 | OXYGEN SATURATION: 98 %

## 2017-05-03 DIAGNOSIS — I48.91 ATRIAL FIBRILLATION WITH RVR (HCC): Primary | ICD-10-CM

## 2017-05-03 PROCEDURE — 80061 LIPID PANEL: CPT | Performed by: EMERGENCY MEDICINE

## 2017-05-03 PROCEDURE — 80053 COMPREHEN METABOLIC PANEL: CPT | Performed by: EMERGENCY MEDICINE

## 2017-05-03 PROCEDURE — 85730 THROMBOPLASTIN TIME PARTIAL: CPT | Performed by: EMERGENCY MEDICINE

## 2017-05-03 PROCEDURE — 71010 XR CHEST AP PORTABLE  (CPT=71010): CPT

## 2017-05-03 PROCEDURE — 84484 ASSAY OF TROPONIN QUANT: CPT | Performed by: EMERGENCY MEDICINE

## 2017-05-03 PROCEDURE — 96365 THER/PROPH/DIAG IV INF INIT: CPT

## 2017-05-03 PROCEDURE — 99285 EMERGENCY DEPT VISIT HI MDM: CPT

## 2017-05-03 PROCEDURE — 85025 COMPLETE CBC W/AUTO DIFF WBC: CPT | Performed by: EMERGENCY MEDICINE

## 2017-05-03 PROCEDURE — 93010 ELECTROCARDIOGRAM REPORT: CPT

## 2017-05-03 PROCEDURE — 93005 ELECTROCARDIOGRAM TRACING: CPT

## 2017-05-03 PROCEDURE — 85610 PROTHROMBIN TIME: CPT | Performed by: EMERGENCY MEDICINE

## 2017-05-03 PROCEDURE — 85378 FIBRIN DEGRADE SEMIQUANT: CPT | Performed by: EMERGENCY MEDICINE

## 2017-05-03 PROCEDURE — 71275 CT ANGIOGRAPHY CHEST: CPT

## 2017-05-03 PROCEDURE — 82962 GLUCOSE BLOOD TEST: CPT

## 2017-05-03 PROCEDURE — 96366 THER/PROPH/DIAG IV INF ADDON: CPT

## 2017-05-03 PROCEDURE — 84443 ASSAY THYROID STIM HORMONE: CPT | Performed by: EMERGENCY MEDICINE

## 2017-05-03 PROCEDURE — 70450 CT HEAD/BRAIN W/O DYE: CPT

## 2017-05-03 RX ORDER — FLECAINIDE ACETATE 100 MG/1
100 TABLET ORAL 2 TIMES DAILY
Qty: 60 TABLET | Refills: 0 | Status: SHIPPED | OUTPATIENT
Start: 2017-05-03 | End: 2017-06-02

## 2017-05-03 RX ORDER — ADENOSINE 3 MG/ML
6 INJECTION, SOLUTION INTRAVENOUS ONCE
Status: DISCONTINUED | OUTPATIENT
Start: 2017-05-03 | End: 2017-05-03

## 2017-05-03 RX ORDER — DILTIAZEM HYDROCHLORIDE 5 MG/ML
20 INJECTION INTRAVENOUS ONCE
Status: COMPLETED | OUTPATIENT
Start: 2017-05-03 | End: 2017-05-03

## 2017-05-03 RX ORDER — DEXTROSE AND SODIUM CHLORIDE 5; .45 G/100ML; G/100ML
INJECTION, SOLUTION INTRAVENOUS CONTINUOUS
Status: CANCELLED | OUTPATIENT
Start: 2017-05-03 | End: 2017-05-03

## 2017-05-03 RX ORDER — DILTIAZEM HYDROCHLORIDE 5 MG/ML
INJECTION INTRAVENOUS
Status: DISCONTINUED
Start: 2017-05-03 | End: 2017-05-03

## 2017-05-03 RX ORDER — ONDANSETRON 2 MG/ML
4 INJECTION INTRAMUSCULAR; INTRAVENOUS EVERY 4 HOURS PRN
Status: CANCELLED | OUTPATIENT
Start: 2017-05-03

## 2017-05-03 RX ORDER — ADENOSINE 3 MG/ML
INJECTION, SOLUTION INTRAVENOUS
Status: DISCONTINUED
Start: 2017-05-03 | End: 2017-05-03 | Stop reason: WASHOUT

## 2017-05-03 RX ORDER — DILTIAZEM HYDROCHLORIDE 5 MG/ML
25 INJECTION INTRAVENOUS ONCE
Status: COMPLETED | OUTPATIENT
Start: 2017-05-03 | End: 2017-05-03

## 2017-05-03 RX ORDER — FLECAINIDE ACETATE 100 MG/1
200 TABLET ORAL ONCE
Status: COMPLETED | OUTPATIENT
Start: 2017-05-03 | End: 2017-05-03

## 2017-05-03 NOTE — CONSULTS
[facility]  Report of Consultation    Nikolay Foote Patient Status:  Emergency    1/3/1955 MRN [id number]   Location [address] [facility] Attending Claudene Corp, MD   Hosp Day # 0 PCP Molly Cheadle, MD     Reason for [facility] fracture, right 1/1999     fracture of lateral aspect of base of distal phalanges of Rt great toe   • Internal hemorrhoids 5/2004   • Sleep apnea          Past Surgical History    APPENDECTOMY  8/1991    COLONOSCOPY  5/7/2004    Comment to cecum    OTHER S weight 205 lb (92.987 kg), SpO2 100 %.   Temp (24hrs), Av °F (36.7 °C), Min:98 °F (36.7 °C), Max:98 °F (36.7 °C)    Wt Readings from Last 3 Encounters:  17 : 205 lb (92.987 kg)  17 : 205 lb (92.987 kg)  17 : 213 lb (96.616 kg)      Tel

## 2017-05-03 NOTE — ED INITIAL ASSESSMENT (HPI)
Pt states he felt his heart beating really fast around 0200 today then syncopal episode around 0730.  Pt states now he \"feels numb all over\"

## 2017-05-03 NOTE — ED PROVIDER NOTES
Patient Seen in: [facility] Emergency Department    History   Patient presents with:  Chest Pain Angina (cardiovascular)    Stated Complaint: chest pain    HPI    Patient comes in with palpitations starting around 2 AM.  He has had intermittent shor COMPLETE  12/6/2013    CARDIOVERSION  2011, 2015, 2016, 2017    OTHER SURGICAL HISTORY  3/6/2003    Comment excision of EIC on Lt upper back    OTHER SURGICAL HISTORY  2/6/2003    Comment biopsy of skin lesion from Lt neck    NDSC ABLATION & RCNSTJ ATRIA L 05/03/17 0829 None (Room air)       Current:BP 90/70 mmHg  Pulse 120  Temp(Src) 98 °F (36.7 °C) (Temporal)  Resp 18  Ht 193 cm (6' 4\")  Wt 92.987 kg  BMI 24.96 kg/m2  SpO2 97%        Physical Exam    General: Well-developed, well-nourished, anxious, light cxr no acute process  CT brain no bleed  CTA chest no evidence for PE  MDM   Patient was given IV  diltiazem which has slowed his rate down into the 90s still in A. fib. Cardizem drip started.   Will page Dr. Wil Carrion contacted and david

## 2017-05-04 ENCOUNTER — PRIOR ORIGINAL RECORDS (OUTPATIENT)
Dept: OTHER | Age: 62
End: 2017-05-04

## 2017-05-08 ENCOUNTER — PRIOR ORIGINAL RECORDS (OUTPATIENT)
Dept: OTHER | Age: 62
End: 2017-05-08

## 2017-05-08 LAB
ALBUMIN: 4 G/DL
ALKALINE PHOSPHATATE(ALK PHOS): 61 IU/L
BILIRUBIN TOTAL: 0.7 MG/DL
BUN: 21 MG/DL
CALCIUM: 9.1 MG/DL
CHLORIDE: 106 MEQ/L
CREATININE, SERUM: 1.45 MG/DL
GLUCOSE: 88 MG/DL
HEMATOCRIT: 47.1 %
HEMOGLOBIN: 16.5 G/DL
PLATELETS: 176 K/UL
POTASSIUM, SERUM: 3.7 MEQ/L
PROTEIN, TOTAL: 6.9 G/DL
RED BLOOD COUNT: 4.91 X 10-6/U
SGOT (AST): 20 IU/L
SGPT (ALT): 19 IU/L
SODIUM: 141 MEQ/L
WHITE BLOOD COUNT: 7.3 X 10-3/U

## 2017-05-12 ENCOUNTER — PRIOR ORIGINAL RECORDS (OUTPATIENT)
Dept: OTHER | Age: 62
End: 2017-05-12

## 2017-05-30 ENCOUNTER — MYAURORA ACCOUNT LINK (OUTPATIENT)
Dept: OTHER | Age: 62
End: 2017-05-30

## 2017-05-30 ENCOUNTER — HOSPITAL ENCOUNTER (OUTPATIENT)
Dept: CV DIAGNOSTICS | Age: 62
Discharge: HOME | End: 2017-05-30
Attending: INTERNAL MEDICINE
Payer: COMMERCIAL

## 2017-05-30 DIAGNOSIS — I48.91 ATRIAL FIBRILLATION, UNSPECIFIED TYPE (HCC): ICD-10-CM

## 2017-05-30 DIAGNOSIS — I48.92 ATRIAL FLUTTER, UNSPECIFIED TYPE (HCC): ICD-10-CM

## 2017-06-06 ENCOUNTER — PRIOR ORIGINAL RECORDS (OUTPATIENT)
Dept: OTHER | Age: 62
End: 2017-06-06

## 2017-06-12 ENCOUNTER — PRIOR ORIGINAL RECORDS (OUTPATIENT)
Dept: OTHER | Age: 62
End: 2017-06-12

## 2017-06-13 ENCOUNTER — OFFICE VISIT (OUTPATIENT)
Dept: INTERNAL MEDICINE CLINIC | Facility: CLINIC | Age: 62
End: 2017-06-13

## 2017-06-13 VITALS
HEIGHT: 75 IN | SYSTOLIC BLOOD PRESSURE: 100 MMHG | HEART RATE: 63 BPM | WEIGHT: 202 LBS | RESPIRATION RATE: 16 BRPM | DIASTOLIC BLOOD PRESSURE: 58 MMHG | OXYGEN SATURATION: 99 % | BODY MASS INDEX: 25.12 KG/M2

## 2017-06-13 DIAGNOSIS — I48.0 PAROXYSMAL A-FIB (HCC): Primary | ICD-10-CM

## 2017-06-13 PROBLEM — I48.91 ATRIAL FIBRILLATION WITH RVR (HCC): Status: RESOLVED | Noted: 2017-05-03 | Resolved: 2017-06-13

## 2017-06-13 PROBLEM — E78.2 MIXED HYPERLIPIDEMIA: Status: ACTIVE | Noted: 2017-06-13

## 2017-06-13 PROCEDURE — 99213 OFFICE O/P EST LOW 20 MIN: CPT | Performed by: INTERNAL MEDICINE

## 2017-06-13 RX ORDER — FLECAINIDE ACETATE 100 MG/1
100 TABLET ORAL 2 TIMES DAILY
COMMUNITY

## 2017-06-13 NOTE — PROGRESS NOTES
HPI:    Patient ID: Manon Simmonds is a 60year old male.     HPI  Patient comes in after visit to ER with palpitations starting around 2 AM.  He has had intermittent short episodes of chest pressure in the upper chest .  He felt lightheaded and believe motion. He exhibits no edema. Skin: No rash noted. He is not diaphoretic. Nursing note and vitals reviewed. ASSESSMENT/PLAN:   Paroxysmal a-fib (hcc)  (primary encounter diagnosis)  - afib- now in nsr.  Cont meds as recommended by cardiology

## 2017-06-13 NOTE — PATIENT INSTRUCTIONS
Understanding Atrial Fibrillation    An arrhythmia is any problem with the speed or pattern of the heartbeat. Atrial fibrillation (AFib) is a common type of arrhythmia. It causes fast, chaotic electrical signals in the atria.  This leads to poor functioni · A fast, pounding, irregular heartbeat  · Shortness of breath  · Tiredness  · Dizziness or fainting  · Chest pain  How is atrial fibrillation treated? Treatments for AFib can include any of the options below. · Medicines.  You may be prescribed:  ¨ Heart © 7224-3996 [address], [address] [place]. All rights reserved. This information is not intended as a substitute for professional medical care. Always follow your healthcare professional's instructions.

## 2017-06-21 LAB
ALT (SGPT): 14 U/L
CHOLESTEROL, TOTAL: 115 MG/DL
HDL CHOLESTEROL: 45 MG/DL
LDL CHOLESTEROL: 54 MG/DL
TRIGLYCERIDES: 81 MG/DL

## 2017-06-22 ENCOUNTER — PRIOR ORIGINAL RECORDS (OUTPATIENT)
Dept: OTHER | Age: 62
End: 2017-06-22

## 2017-07-26 ENCOUNTER — MED REC SCAN ONLY (OUTPATIENT)
Dept: INTERNAL MEDICINE CLINIC | Facility: CLINIC | Age: 62
End: 2017-07-26

## 2017-08-09 ENCOUNTER — PRIOR ORIGINAL RECORDS (OUTPATIENT)
Dept: OTHER | Age: 62
End: 2017-08-09

## 2017-08-22 ENCOUNTER — MED REC SCAN ONLY (OUTPATIENT)
Dept: INTERNAL MEDICINE CLINIC | Facility: CLINIC | Age: 62
End: 2017-08-22

## 2017-08-28 ENCOUNTER — HOSPITAL ENCOUNTER (OUTPATIENT)
Dept: CV DIAGNOSTICS | Facility: HOSPITAL | Age: 62
Discharge: HOME | End: 2017-08-28
Attending: INTERNAL MEDICINE
Payer: COMMERCIAL

## 2017-08-28 DIAGNOSIS — I48.91 ATRIAL FIBRILLATION (HCC): ICD-10-CM

## 2017-08-28 DIAGNOSIS — R00.2 PALPITATIONS: ICD-10-CM

## 2017-08-28 PROCEDURE — 93225 XTRNL ECG REC<48 HRS REC: CPT | Performed by: INTERNAL MEDICINE

## 2017-08-28 PROCEDURE — 93227 XTRNL ECG REC<48 HR R&I: CPT | Performed by: INTERNAL MEDICINE

## 2017-08-28 PROCEDURE — 93226 XTRNL ECG REC<48 HR SCAN A/R: CPT | Performed by: INTERNAL MEDICINE

## 2017-09-11 ENCOUNTER — PRIOR ORIGINAL RECORDS (OUTPATIENT)
Dept: OTHER | Age: 62
End: 2017-09-11

## 2017-09-22 ENCOUNTER — TELEPHONE (OUTPATIENT)
Dept: INTERNAL MEDICINE CLINIC | Facility: CLINIC | Age: 62
End: 2017-09-22

## 2017-09-22 DIAGNOSIS — M54.50 ACUTE BILATERAL LOW BACK PAIN WITHOUT SCIATICA: Primary | ICD-10-CM

## 2017-09-22 RX ORDER — METHYLPREDNISOLONE 4 MG/1
TABLET ORAL
Qty: 1 KIT | Refills: 0 | Status: SHIPPED | OUTPATIENT
Start: 2017-09-22 | End: 2017-09-25 | Stop reason: ALTCHOICE

## 2017-09-22 NOTE — TELEPHONE ENCOUNTER
Pt was seen on 3/25/2017 for low back pain and was treated with cyclobenzaprine nightly. The pt then FU with PT. The pt states can not get out of bed this morning due to bilateral lower back spasms.  The pt reports the pain began on the right side then

## 2017-09-22 NOTE — TELEPHONE ENCOUNTER
Pt is having some lower back spasms and cant get out of bed, wants to know what he can do, call back

## 2017-09-25 ENCOUNTER — HOSPITAL ENCOUNTER (OUTPATIENT)
Dept: GENERAL RADIOLOGY | Age: 62
Discharge: HOME | End: 2017-09-25
Attending: INTERNAL MEDICINE
Payer: COMMERCIAL

## 2017-09-25 ENCOUNTER — OFFICE VISIT (OUTPATIENT)
Dept: INTERNAL MEDICINE CLINIC | Facility: CLINIC | Age: 62
End: 2017-09-25

## 2017-09-25 VITALS
TEMPERATURE: 98 F | BODY MASS INDEX: 25.24 KG/M2 | DIASTOLIC BLOOD PRESSURE: 70 MMHG | HEART RATE: 73 BPM | SYSTOLIC BLOOD PRESSURE: 102 MMHG | OXYGEN SATURATION: 97 % | RESPIRATION RATE: 16 BRPM | HEIGHT: 75 IN | WEIGHT: 203 LBS

## 2017-09-25 DIAGNOSIS — Z85.46 HISTORY OF PROSTATE CANCER: ICD-10-CM

## 2017-09-25 DIAGNOSIS — M54.42 ACUTE LEFT-SIDED LOW BACK PAIN WITH LEFT-SIDED SCIATICA: Primary | ICD-10-CM

## 2017-09-25 DIAGNOSIS — M54.42 ACUTE LEFT-SIDED LOW BACK PAIN WITH LEFT-SIDED SCIATICA: ICD-10-CM

## 2017-09-25 DIAGNOSIS — Z28.21 INFLUENZA VACCINATION DECLINED BY PATIENT: ICD-10-CM

## 2017-09-25 PROCEDURE — 72100 X-RAY EXAM L-S SPINE 2/3 VWS: CPT | Performed by: INTERNAL MEDICINE

## 2017-09-25 PROCEDURE — 99214 OFFICE O/P EST MOD 30 MIN: CPT | Performed by: INTERNAL MEDICINE

## 2017-09-25 RX ORDER — CYCLOBENZAPRINE HCL 10 MG
10 TABLET ORAL 3 TIMES DAILY PRN
COMMUNITY
End: 2017-10-19 | Stop reason: ALTCHOICE

## 2017-09-25 NOTE — PATIENT INSTRUCTIONS
Self-Care for Low Back Pain    Most people have low back pain now and then. In many cases, it isn’t serious and self-care can help. Sometimes low back pain can be a sign of a bigger problem.  Call your healthcare provider if your pain returns often or get © 0625-9824 [address], [address] [place]. All rights reserved. This information is not intended as a substitute for professional medical care. Always follow your healthcare professional's instructions.

## 2017-09-25 NOTE — PROGRESS NOTES
HPI:    Patient ID: Julio César Curry is a 60year old male. Patient here for follow-up back pain. Currently taking the Medrol dose pack and that has helped with the pain.   Pain is worse when making sudden movements (twisting, bending) rates his pain polydipsia and polyphagia. Genitourinary: Negative for dysuria. Musculoskeletal: Positive for back pain. Skin: Negative for rash. Neurological: Negative for dizziness, seizures, speech difficulty and numbness.    Hematological: Negative for adenopat AND CHRONICITY OF SXS    James Laboy MD  No orders of the defined types were placed in this encounter.       Meds This Visit:  No prescriptions requested or ordered in this encounter       Imaging & Referrals:  None         #[id number]

## 2017-09-26 NOTE — PROGRESS NOTES
Per hippa, left detailed message for pt informing of results. Pt is to call the office back with any further questions or concerns.

## 2017-10-04 ENCOUNTER — HOSPITAL ENCOUNTER (OUTPATIENT)
Dept: MRI IMAGING | Age: 62
Discharge: HOME | End: 2017-10-04
Attending: INTERNAL MEDICINE
Payer: COMMERCIAL

## 2017-10-04 DIAGNOSIS — M54.42 ACUTE LEFT-SIDED LOW BACK PAIN WITH LEFT-SIDED SCIATICA: ICD-10-CM

## 2017-10-04 PROCEDURE — 72148 MRI LUMBAR SPINE W/O DYE: CPT | Performed by: INTERNAL MEDICINE

## 2017-10-06 NOTE — PROGRESS NOTES
Discussed results and recommendations with pt. Understanding expressed. He will discuss PT at upcoming office visit.

## 2017-10-16 ENCOUNTER — HOSPITAL ENCOUNTER (OUTPATIENT)
Dept: CV DIAGNOSTICS | Facility: HOSPITAL | Age: 62
Discharge: HOME | End: 2017-10-16
Attending: INTERNAL MEDICINE
Payer: COMMERCIAL

## 2017-10-16 DIAGNOSIS — I25.10 CVD (CARDIOVASCULAR DISEASE): ICD-10-CM

## 2017-10-16 DIAGNOSIS — I47.2 VENTRICULAR TACHYARRHYTHMIA (HCC): ICD-10-CM

## 2017-10-16 PROCEDURE — 93017 CV STRESS TEST TRACING ONLY: CPT | Performed by: INTERNAL MEDICINE

## 2017-10-16 PROCEDURE — 93018 CV STRESS TEST I&R ONLY: CPT | Performed by: INTERNAL MEDICINE

## 2017-10-19 ENCOUNTER — OFFICE VISIT (OUTPATIENT)
Dept: INTERNAL MEDICINE CLINIC | Facility: CLINIC | Age: 62
End: 2017-10-19

## 2017-10-19 VITALS
HEIGHT: 75 IN | BODY MASS INDEX: 25.24 KG/M2 | SYSTOLIC BLOOD PRESSURE: 110 MMHG | HEART RATE: 61 BPM | RESPIRATION RATE: 16 BRPM | WEIGHT: 203 LBS | OXYGEN SATURATION: 98 % | TEMPERATURE: 98 F | DIASTOLIC BLOOD PRESSURE: 70 MMHG

## 2017-10-19 DIAGNOSIS — Z23 NEED FOR INFLUENZA VACCINATION: ICD-10-CM

## 2017-10-19 DIAGNOSIS — M54.50 CHRONIC BILATERAL LOW BACK PAIN WITHOUT SCIATICA: Primary | ICD-10-CM

## 2017-10-19 DIAGNOSIS — G89.29 CHRONIC BILATERAL LOW BACK PAIN WITHOUT SCIATICA: Primary | ICD-10-CM

## 2017-10-19 PROCEDURE — 99213 OFFICE O/P EST LOW 20 MIN: CPT | Performed by: INTERNAL MEDICINE

## 2017-10-19 PROCEDURE — 90471 IMMUNIZATION ADMIN: CPT | Performed by: INTERNAL MEDICINE

## 2017-10-19 PROCEDURE — 90686 IIV4 VACC NO PRSV 0.5 ML IM: CPT | Performed by: INTERNAL MEDICINE

## 2017-10-19 NOTE — PROGRESS NOTES
HPI:    Patient ID: Nikolay Foote is a 60year old male. Patient here for 4 week follow-up of his lower back pain and MRI. States he is feeling much better,no spasms currently, and has the normal stiffness.  Based on his MRI results, pt would like Neck supple. Cardiovascular: Normal rate, regular rhythm and normal heart sounds. Pulmonary/Chest: Effort normal and breath sounds normal.   Abdominal: Soft. Bowel sounds are normal.   Musculoskeletal: Normal range of motion.    Neurological: He is allison

## 2017-10-24 ENCOUNTER — PRIOR ORIGINAL RECORDS (OUTPATIENT)
Dept: OTHER | Age: 62
End: 2017-10-24

## 2017-12-21 ENCOUNTER — OFFICE VISIT (OUTPATIENT)
Dept: FAMILY MEDICINE CLINIC | Facility: CLINIC | Age: 62
End: 2017-12-21

## 2017-12-21 VITALS
WEIGHT: 202 LBS | OXYGEN SATURATION: 98 % | DIASTOLIC BLOOD PRESSURE: 74 MMHG | TEMPERATURE: 98 F | RESPIRATION RATE: 16 BRPM | BODY MASS INDEX: 25.12 KG/M2 | SYSTOLIC BLOOD PRESSURE: 118 MMHG | HEIGHT: 75 IN | HEART RATE: 64 BPM

## 2017-12-21 DIAGNOSIS — J01.00 ACUTE MAXILLARY SINUSITIS, RECURRENCE NOT SPECIFIED: Primary | ICD-10-CM

## 2017-12-21 PROCEDURE — 99213 OFFICE O/P EST LOW 20 MIN: CPT | Performed by: NURSE PRACTITIONER

## 2017-12-21 RX ORDER — AMOXICILLIN AND CLAVULANATE POTASSIUM 875; 125 MG/1; MG/1
1 TABLET, FILM COATED ORAL 2 TIMES DAILY
Qty: 20 TABLET | Refills: 0 | Status: SHIPPED | OUTPATIENT
Start: 2017-12-21 | End: 2017-12-31

## 2017-12-21 NOTE — PROGRESS NOTES
Maria Fernanda Anne is a 60year old male. Patient presents with:  Cold: sinus pressure,congestion sx x 2-3 days. HPI:    Symptoms started  3 days ago with purulent nasal discharge, maxillary sinus pressure, and headache, a lot of  post-nasal drip. Sarah Buchanan ABLATION & RCNSTJ ATRIA LMTD W/O BYPASS  6/29/2011: OTHER SURGICAL HISTORY      Comment: robotic-assisted laparoscopic raidcal                prostatectomy & B/L pelvic lymph node                dissection  3/6/2003: OTHER SURGICAL HISTORY      Comment: ex

## 2018-01-30 ENCOUNTER — TELEPHONE (OUTPATIENT)
Dept: INTERNAL MEDICINE CLINIC | Facility: CLINIC | Age: 63
End: 2018-01-30

## 2018-01-30 NOTE — TELEPHONE ENCOUNTER
Patient spouse called and stated Vonda Valencia was exposed to Influenza B virus, and hasn't vomited, but since last night has diarrhea, aches, and chills. Please advise.

## 2018-01-30 NOTE — TELEPHONE ENCOUNTER
Spoke with David Justice, states patient does not have a fever or vomiting. His complaints are mostly GI related. Denies blood in stool. Per Dr. Eli Morton, symptoms sound viral in nature. He recommends rest, fluids, bland diet, hand hygiene.  Follow up in the of

## 2018-02-01 ENCOUNTER — OFFICE VISIT (OUTPATIENT)
Dept: INTERNAL MEDICINE CLINIC | Facility: CLINIC | Age: 63
End: 2018-02-01

## 2018-02-01 VITALS
HEIGHT: 75 IN | RESPIRATION RATE: 16 BRPM | BODY MASS INDEX: 25.36 KG/M2 | DIASTOLIC BLOOD PRESSURE: 72 MMHG | WEIGHT: 204 LBS | OXYGEN SATURATION: 96 % | TEMPERATURE: 97 F | SYSTOLIC BLOOD PRESSURE: 102 MMHG | HEART RATE: 72 BPM

## 2018-02-01 DIAGNOSIS — B34.9 ACUTE VIRAL SYNDROME: Primary | ICD-10-CM

## 2018-02-01 DIAGNOSIS — J20.9 ACUTE BRONCHITIS, UNSPECIFIED ORGANISM: ICD-10-CM

## 2018-02-01 PROCEDURE — 87798 DETECT AGENT NOS DNA AMP: CPT | Performed by: STUDENT IN AN ORGANIZED HEALTH CARE EDUCATION/TRAINING PROGRAM

## 2018-02-01 PROCEDURE — 99214 OFFICE O/P EST MOD 30 MIN: CPT | Performed by: STUDENT IN AN ORGANIZED HEALTH CARE EDUCATION/TRAINING PROGRAM

## 2018-02-01 PROCEDURE — 87502 INFLUENZA DNA AMP PROBE: CPT | Performed by: STUDENT IN AN ORGANIZED HEALTH CARE EDUCATION/TRAINING PROGRAM

## 2018-02-01 RX ORDER — OSELTAMIVIR PHOSPHATE 75 MG/1
75 CAPSULE ORAL 2 TIMES DAILY
Qty: 10 CAPSULE | Refills: 0 | Status: SHIPPED | OUTPATIENT
Start: 2018-02-01 | End: 2018-02-06

## 2018-02-01 RX ORDER — PREDNISONE 20 MG/1
20 TABLET ORAL DAILY
Qty: 5 TABLET | Refills: 0 | Status: SHIPPED | OUTPATIENT
Start: 2018-02-01 | End: 2018-02-06

## 2018-02-01 RX ORDER — DOXYCYCLINE HYCLATE 100 MG/1
100 CAPSULE ORAL 2 TIMES DAILY
Qty: 20 CAPSULE | Refills: 0 | Status: SHIPPED | OUTPATIENT
Start: 2018-02-01 | End: 2018-05-17 | Stop reason: ALTCHOICE

## 2018-02-01 NOTE — PROGRESS NOTES
HPI:    Patient ID: Marin Hidalgo is a 58year old male. Cough   This is a new problem. The current episode started in the past 7 days. The problem has been gradually worsening. The problem occurs hourly. The cough is productive of sputum.  Associat by mouth daily. Disp:  Rfl:      Allergies:No Known Allergies   PHYSICAL EXAM:   Physical Exam   Constitutional: He is oriented to person, place, and time. He appears well-developed and well-nourished. HENT:   Head: Normocephalic and atraumatic.    Right in length. After visit instructions are provided to the patient. The patient indicates understanding of these issues and agrees to the plan. The patient is asked to return if symptoms persist or worsen.         Orders Placed This Encounter      Influenza

## 2018-02-01 NOTE — PATIENT INSTRUCTIONS
Viral Syndrome (Adult)  A viral illness may cause a number of symptoms. The symptoms depend on the part of the body that the virus affects.  If it settles in your nose, throat, and lungs, it may cause cough, sore throat, congestion, and sometimes headache · Over-the-counter remedies won't shorten the length of the illness but may be helpful for cough, sore throat; and nasal and sinus congestion. Don't use decongestants if you have high blood pressure.   Follow-up care  Follow up with your healthcare provider Your healthcare provider has told you that you have acute bronchitis. Bronchitis is infection or inflammation of the bronchial tubes (airways in the lungs). Normally, air moves easily in and out of the airways.  Bronchitis narrows the airways, making it crispin · Drink plenty of fluids, such as water, juice, or warm soup. Fluids loosen mucus so that you can cough it up. This helps you breathe more easily. Fluids also prevent dehydration. · Make sure you get plenty of rest.  · Do not smoke.  Do not allow anyone el

## 2018-02-02 ENCOUNTER — TELEPHONE (OUTPATIENT)
Dept: INTERNAL MEDICINE CLINIC | Facility: CLINIC | Age: 63
End: 2018-02-02

## 2018-02-02 NOTE — TELEPHONE ENCOUNTER
Spoke with Claude Herrlich for [facility] lab she reports: Positive for Influenza A Nucleic Acid      Per Dr. Monika Little finish tamiflu and other medications prescribed. D/w pt results and above recommendations. He expressed understanding.

## 2018-02-06 ENCOUNTER — PRIOR ORIGINAL RECORDS (OUTPATIENT)
Dept: OTHER | Age: 63
End: 2018-02-06

## 2018-02-07 ENCOUNTER — PRIOR ORIGINAL RECORDS (OUTPATIENT)
Dept: OTHER | Age: 63
End: 2018-02-07

## 2018-02-21 ENCOUNTER — PRIOR ORIGINAL RECORDS (OUTPATIENT)
Dept: OTHER | Age: 63
End: 2018-02-21

## 2018-04-26 ENCOUNTER — TELEPHONE (OUTPATIENT)
Dept: INTERNAL MEDICINE CLINIC | Facility: CLINIC | Age: 63
End: 2018-04-26

## 2018-04-26 DIAGNOSIS — Z13.228 SCREENING FOR METABOLIC DISORDER: ICD-10-CM

## 2018-04-26 DIAGNOSIS — Z13.0 SCREENING, ANEMIA, DEFICIENCY, IRON: ICD-10-CM

## 2018-04-26 DIAGNOSIS — Z13.220 SCREENING FOR LIPID DISORDERS: ICD-10-CM

## 2018-04-26 DIAGNOSIS — Z13.1 SCREENING FOR DIABETES MELLITUS (DM): ICD-10-CM

## 2018-04-26 DIAGNOSIS — Z13.29 SCREENING FOR THYROID DISORDER: ICD-10-CM

## 2018-04-26 DIAGNOSIS — Z12.5 SCREENING FOR MALIGNANT NEOPLASM OF PROSTATE: ICD-10-CM

## 2018-04-26 DIAGNOSIS — Z13.89 SCREENING FOR BLOOD OR PROTEIN IN URINE: ICD-10-CM

## 2018-05-10 ENCOUNTER — PRIOR ORIGINAL RECORDS (OUTPATIENT)
Dept: OTHER | Age: 63
End: 2018-05-10

## 2018-05-17 ENCOUNTER — OFFICE VISIT (OUTPATIENT)
Dept: INTERNAL MEDICINE CLINIC | Facility: CLINIC | Age: 63
End: 2018-05-17

## 2018-05-17 VITALS
SYSTOLIC BLOOD PRESSURE: 120 MMHG | OXYGEN SATURATION: 97 % | DIASTOLIC BLOOD PRESSURE: 70 MMHG | TEMPERATURE: 97 F | WEIGHT: 207 LBS | HEART RATE: 58 BPM | HEIGHT: 75 IN | BODY MASS INDEX: 25.74 KG/M2 | RESPIRATION RATE: 16 BRPM

## 2018-05-17 DIAGNOSIS — L30.9 DERMATITIS: ICD-10-CM

## 2018-05-17 DIAGNOSIS — Z00.00 PHYSICAL EXAM, ANNUAL: Primary | ICD-10-CM

## 2018-05-17 PROCEDURE — 99396 PREV VISIT EST AGE 40-64: CPT | Performed by: INTERNAL MEDICINE

## 2018-05-17 NOTE — PATIENT INSTRUCTIONS
Prevention Guidelines, Men Ages 48 to 59  Screening tests and vaccines are an important part of managing your health. Health counseling is essential, too. Below are guidelines for these, for men ages 48 to 59.  Talk with your healthcare provider to make s Prostate cancer Starting at age 39, talk to healthcare provider about risks and benefits of digital rectal exam (RADHA) and prostate-specific antigen (PSA) screening1 At routine exams   Syphilis Men at increased risk for infection – talk with your healthcare pertussis (Td/Tdap) booster All men in this age group Td every 10 years, or a one-time dose of Tdap instead of a Td booster after age 25, then Td every 8 years   Zoster All men ages 61 and older 1 dose   Counseling Who needs it How often   Diet and exerci

## 2018-05-17 NOTE — PROGRESS NOTES
HPI:    Patient ID: Jolene Santiago is a 58year old male.     HPI  Jolene Santiago is a 58year old male who presents for a complete physical exam.   HPI:   Pt complains of left knee pain after long walks  Rash on forehead    PAST MEDICAL, SOCIAL, FA Oral Tab Take 1 tablet by mouth daily.  Disp:  Rfl:       Past Medical History:   Diagnosis Date   • Allergic rhinitis 20+ years ago    Some years worse than others   • Anxiety May 2017   • Atrial fibrillation [facility]) 11/11   • Cancer [facility]) June 2011    [facility] Father      gout   • Heart Disorder Father      AAA   • Other Father      Aortic Aneurysm   • Arthritis Mother      Rheumatoid   • Other Elza Spatz Mother    • Other Mother      Parkinson's disease & rheumatoid arthritis   • Breast Cancer Sister 35   • Other atraumatic, normocephalic,ears and throat are clear  EYES:PERRLA, EOMI, normal optic disk,conjunctiva are clear  NECK: supple,no adenopathy,no bruits  LUNGS: clear to auscultation  CARDIO: RRR without murmur  GI: good BS's,no masses, HSM or tenderness  MUS

## 2018-06-05 LAB
ALBUMIN: 4.2 G/DL
ALKALINE PHOSPHATATE(ALK PHOS): 66 IU/L
BUN: 18 MG/DL
CALCIUM: 9.4 MG/DL
CHOLESTEROL, TOTAL: 126 MG/DL
CREATININE, SERUM: 1.25 MG/DL
HDL CHOLESTEROL: 50 MG/DL
HEMATOCRIT: 48.7 %
HEMOGLOBIN A1C: 5 %
HEMOGLOBIN: 16.3 G/DL
LDL CHOLESTEROL: 60 MG/DL
MCH: 32.8 PG
MCHC: 33.5 G/DL
MCV: 98 FL
PLATELETS: 171 K/UL
TRIGLYCERIDES: 76 MG/DL

## 2018-06-26 ENCOUNTER — PRIOR ORIGINAL RECORDS (OUTPATIENT)
Dept: OTHER | Age: 63
End: 2018-06-26

## 2018-07-03 ENCOUNTER — PRIOR ORIGINAL RECORDS (OUTPATIENT)
Dept: OTHER | Age: 63
End: 2018-07-03

## 2018-07-09 ENCOUNTER — PRIOR ORIGINAL RECORDS (OUTPATIENT)
Dept: OTHER | Age: 63
End: 2018-07-09

## 2018-07-19 LAB
ALT (SGPT): 21 U/L
AST (SGOT): 23 U/L
CHOLESTEROL, TOTAL: 133 MG/DL
HDL CHOLESTEROL: 50 MG/DL
LDL CHOLESTEROL: 67 MG/DL
TRIGLYCERIDES: 75 MG/DL

## 2018-08-17 ENCOUNTER — OFFICE VISIT (OUTPATIENT)
Dept: INTERNAL MEDICINE CLINIC | Facility: CLINIC | Age: 63
End: 2018-08-17
Payer: COMMERCIAL

## 2018-08-17 VITALS
TEMPERATURE: 97 F | OXYGEN SATURATION: 99 % | WEIGHT: 209 LBS | SYSTOLIC BLOOD PRESSURE: 110 MMHG | HEART RATE: 56 BPM | DIASTOLIC BLOOD PRESSURE: 66 MMHG | HEIGHT: 75 IN | BODY MASS INDEX: 25.99 KG/M2

## 2018-08-17 DIAGNOSIS — L30.9 DERMATITIS: Primary | ICD-10-CM

## 2018-08-17 PROCEDURE — 99213 OFFICE O/P EST LOW 20 MIN: CPT | Performed by: NURSE PRACTITIONER

## 2018-08-17 NOTE — PROGRESS NOTES
HPI:    Patient ID: Aren Rodriguez is a 58year old male. Patient presents with:  Rash: on forehead since march has gotten worse the last 3-5 days    Rash   This is a recurrent problem. The current episode started more than 1 month ago.  The problem fracture of lateral aspect of base of distal phalanges of Rt great toe   • Unspecified local infection of skin and subcutaneous tissue 2/2003     Past Surgical History:  8/1991: APPENDECTOMY  2011, 2015, 2016, 2017: CARDIOVERSION  5/7/2004: COLONOSCOPY Alcohol use:  No              Drug use: No            Other Topics            Concern  Seat Belt               No  Special Diet            No           Current Outpatient Prescriptions:  hydrocortisone 2.5 % External Cream Apply 1 Application topically da No results found for: IRON, IRONTOT  No results found for: B12, VITB12    Lab Results  Component Value Date   PHOS 2.3 (L) 01/16/2017       Lab Results  Component Value Date   MG 2.1 04/09/2017        PHYSICAL EXAM:   /66 (BP Location: Left arm, Radha

## 2018-12-03 ENCOUNTER — TELEPHONE (OUTPATIENT)
Dept: INTERNAL MEDICINE CLINIC | Facility: CLINIC | Age: 63
End: 2018-12-03

## 2018-12-03 DIAGNOSIS — M54.50 ACUTE BILATERAL LOW BACK PAIN WITHOUT SCIATICA: ICD-10-CM

## 2018-12-03 DIAGNOSIS — G89.29 CHRONIC LEFT SI JOINT PAIN: ICD-10-CM

## 2018-12-03 DIAGNOSIS — M53.3 CHRONIC LEFT SI JOINT PAIN: ICD-10-CM

## 2018-12-03 RX ORDER — METHYLPREDNISOLONE 4 MG/1
TABLET ORAL
Qty: 1 KIT | Refills: 0 | Status: SHIPPED | OUTPATIENT
Start: 2018-12-03 | End: 2018-12-14 | Stop reason: ALTCHOICE

## 2018-12-03 RX ORDER — CYCLOBENZAPRINE HCL 10 MG
10 TABLET ORAL 2 TIMES DAILY PRN
Qty: 20 TABLET | Refills: 0 | Status: SHIPPED | OUTPATIENT
Start: 2018-12-03 | End: 2018-12-14 | Stop reason: ALTCHOICE

## 2018-12-13 ENCOUNTER — TELEPHONE (OUTPATIENT)
Dept: INTERNAL MEDICINE CLINIC | Facility: CLINIC | Age: 63
End: 2018-12-13

## 2018-12-13 NOTE — TELEPHONE ENCOUNTER
The pt called the offic kathryn 12/3/2018 c/o symptoms and was treated over the phone:   C/O pain and muscle spasms in the lower back.  Rx was sent for a Medrol Dose Сергей as directed and Flexeril 10mg BID PRN #20.    D/w pt above treatment plan he expressed unde

## 2018-12-13 NOTE — TELEPHONE ENCOUNTER
Patient's spouse called and stated patient finished his steroids, and has a couple of muscle relaxer left as prescribed for his back pain. However, requesting to be seen today because there is still pain on the back. Please advise.

## 2018-12-13 NOTE — TELEPHONE ENCOUNTER
Patient of Dr. Demario Pineda, Frustrated that anytime they need an appointment we are booked up and cant get in for 3 or more weeks. Debating looking for new PCP. Wanting to know if we can get them in for severe back pain asap or will go to immediate care.  They artur

## 2018-12-13 NOTE — ED NOTES
PT. Called stating he is having back pain. He reports he has been treated with muscle relaxers and a tapering steroid pack. He reports he finished these a few days ago. He reports he is still having pain in a spot on his left lower back.   He reports he

## 2018-12-13 NOTE — TELEPHONE ENCOUNTER
Patient of Dr. Kelly Aquino, Frustrated that anytime they need an appointment we are booked up and cant get in for 3 or more weeks. Debating looking for new PCP. Wanting to know if we can get them in for severe back pain asap or will go to immediate care.  They artur

## 2018-12-14 ENCOUNTER — OFFICE VISIT (OUTPATIENT)
Dept: INTERNAL MEDICINE CLINIC | Facility: CLINIC | Age: 63
End: 2018-12-14
Payer: COMMERCIAL

## 2018-12-14 VITALS
RESPIRATION RATE: 16 BRPM | DIASTOLIC BLOOD PRESSURE: 72 MMHG | HEART RATE: 64 BPM | BODY MASS INDEX: 25.61 KG/M2 | SYSTOLIC BLOOD PRESSURE: 122 MMHG | TEMPERATURE: 98 F | HEIGHT: 75 IN | WEIGHT: 206 LBS

## 2018-12-14 DIAGNOSIS — M46.1 SACROILIITIS (HCC): Primary | ICD-10-CM

## 2018-12-14 PROCEDURE — 99214 OFFICE O/P EST MOD 30 MIN: CPT | Performed by: INTERNAL MEDICINE

## 2018-12-14 RX ORDER — DICLOFENAC SODIUM 75 MG/1
75 TABLET, DELAYED RELEASE ORAL 2 TIMES DAILY
Qty: 30 TABLET | Refills: 0 | Status: SHIPPED | OUTPATIENT
Start: 2018-12-14 | End: 2019-04-26 | Stop reason: ALTCHOICE

## 2018-12-14 NOTE — PROGRESS NOTES
HPI:    Patient ID: Lima Carlton is a 58year old male. HPI  HPI:   Lima Carlton is a 58year old male who is here for complaints of back pain. Pain is located at low back. Pain is described as dull, aching, shooting.  Severity is moderate, Tendinopathy of left rotator cuff 2007   • Thoracic outlet syndrome 2009   • Toe fracture, right 1/1999    fracture of lateral aspect of base of distal phalanges of Rt great toe   • Unspecified local infection of skin and subcutaneous tissue 2/2003       P times per week.   Diet: watches sugar closely and watches calories closely    REVIEW OF SYSTEMS:   GENERAL: feels well otherwise  SKIN: denies any unusual skin lesions  LUNGS: denies shortness of breath with exertion  CARDIOVASCULAR: denies chest pain on ex prescriptions requested or ordered in this encounter       Imaging & Referrals:  None       CK#[id number]

## 2019-01-09 ENCOUNTER — PRIOR ORIGINAL RECORDS (OUTPATIENT)
Dept: OTHER | Age: 64
End: 2019-01-09

## 2019-01-09 ENCOUNTER — MYAURORA ACCOUNT LINK (OUTPATIENT)
Dept: OTHER | Age: 64
End: 2019-01-09

## 2019-02-15 ENCOUNTER — MED REC SCAN ONLY (OUTPATIENT)
Dept: INTERNAL MEDICINE CLINIC | Facility: CLINIC | Age: 64
End: 2019-02-15

## 2019-02-28 VITALS
HEART RATE: 60 BPM | SYSTOLIC BLOOD PRESSURE: 94 MMHG | DIASTOLIC BLOOD PRESSURE: 54 MMHG | WEIGHT: 213 LBS | BODY MASS INDEX: 26.49 KG/M2 | HEIGHT: 75 IN

## 2019-02-28 VITALS
DIASTOLIC BLOOD PRESSURE: 60 MMHG | HEART RATE: 58 BPM | WEIGHT: 203 LBS | SYSTOLIC BLOOD PRESSURE: 104 MMHG | BODY MASS INDEX: 25.24 KG/M2 | HEIGHT: 75 IN

## 2019-02-28 VITALS
HEART RATE: 67 BPM | SYSTOLIC BLOOD PRESSURE: 90 MMHG | BODY MASS INDEX: 25.12 KG/M2 | HEIGHT: 75 IN | DIASTOLIC BLOOD PRESSURE: 62 MMHG | WEIGHT: 202 LBS

## 2019-03-01 VITALS
SYSTOLIC BLOOD PRESSURE: 106 MMHG | HEIGHT: 75 IN | BODY MASS INDEX: 26.86 KG/M2 | DIASTOLIC BLOOD PRESSURE: 60 MMHG | HEART RATE: 60 BPM | WEIGHT: 216 LBS

## 2019-03-01 VITALS
DIASTOLIC BLOOD PRESSURE: 60 MMHG | HEART RATE: 60 BPM | HEIGHT: 75 IN | BODY MASS INDEX: 26.73 KG/M2 | SYSTOLIC BLOOD PRESSURE: 104 MMHG | WEIGHT: 215 LBS

## 2019-03-01 VITALS
SYSTOLIC BLOOD PRESSURE: 92 MMHG | DIASTOLIC BLOOD PRESSURE: 58 MMHG | HEART RATE: 62 BPM | WEIGHT: 207 LBS | HEIGHT: 75 IN | BODY MASS INDEX: 25.74 KG/M2

## 2019-03-01 VITALS
HEIGHT: 75 IN | SYSTOLIC BLOOD PRESSURE: 98 MMHG | HEART RATE: 64 BPM | DIASTOLIC BLOOD PRESSURE: 60 MMHG | WEIGHT: 211 LBS | BODY MASS INDEX: 26.24 KG/M2

## 2019-03-07 ENCOUNTER — TELEPHONE (OUTPATIENT)
Dept: INTERNAL MEDICINE CLINIC | Facility: CLINIC | Age: 64
End: 2019-03-07

## 2019-03-07 DIAGNOSIS — Z00.00 LABORATORY EXAMINATION ORDERED AS PART OF A COMPLETE PHYSICAL EXAMINATION: Primary | ICD-10-CM

## 2019-03-07 NOTE — TELEPHONE ENCOUNTER
Patient called and requested [facility] labs to be ordered. His annual physical is scheduled for 04/05/2019 with Dr Briseida Puga.  Patient aware to fast.

## 2019-03-27 LAB
ABSOLUTE BASOPHILS: 62 CELLS/UL (ref 0–200)
ABSOLUTE EOSINOPHILS: 241 CELLS/UL (ref 15–500)
ABSOLUTE LYMPHOCYTES: 1691 CELLS/UL (ref 850–3900)
ABSOLUTE MONOCYTES: 409 CELLS/UL (ref 200–950)
ABSOLUTE NEUTROPHILS: 3198 CELLS/UL (ref 1500–7800)
ALBUMIN/GLOBULIN RATIO: 1.9 (CALC) (ref 1–2.5)
ALBUMIN: 4.2 G/DL (ref 3.6–5.1)
ALKALINE PHOSPHATASE: 62 U/L (ref 40–115)
ALT: 14 U/L (ref 9–46)
APPEARANCE: CLEAR
AST: 17 U/L (ref 10–35)
BASOPHILS: 1.1 %
BILIRUBIN, TOTAL: 0.7 MG/DL (ref 0.2–1.2)
BILIRUBIN: NEGATIVE
BUN: 21 MG/DL (ref 7–25)
CALCIUM: 9.3 MG/DL (ref 8.6–10.3)
CARBON DIOXIDE: 34 MMOL/L (ref 20–32)
CHLORIDE: 102 MMOL/L (ref 98–110)
CHOL/HDLC RATIO: 2.9 (CALC)
CHOLESTEROL, TOTAL: 131 MG/DL
COLOR: YELLOW
CREATININE: 1.2 MG/DL (ref 0.7–1.25)
EGFR IF AFRICN AM: 73 ML/MIN/1.73M2
EGFR IF NONAFRICN AM: 63 ML/MIN/1.73M2
EOSINOPHILS: 4.3 %
GLOBULIN: 2.2 G/DL (CALC) (ref 1.9–3.7)
GLUCOSE: 87 MG/DL (ref 65–99)
GLUCOSE: NEGATIVE
HDL CHOLESTEROL: 45 MG/DL
HEMATOCRIT: 47.2 % (ref 38.5–50)
HEMOGLOBIN A1C: 5.1 % OF TOTAL HGB
HEMOGLOBIN: 16.1 G/DL (ref 13.2–17.1)
KETONES: NEGATIVE
LDL-CHOLESTEROL: 68 MG/DL (CALC)
LEUKOCYTE ESTERASE: NEGATIVE
LYMPHOCYTES: 30.2 %
MCH: 34 PG (ref 27–33)
MCHC: 34.1 G/DL (ref 32–36)
MCV: 99.6 FL (ref 80–100)
MONOCYTES: 7.3 %
MPV: 9.7 FL (ref 7.5–12.5)
NEUTROPHILS: 57.1 %
NITRITE: NEGATIVE
NON-HDL CHOLESTEROL: 86 MG/DL (CALC)
OCCULT BLOOD: NEGATIVE
PH: 7.5 (ref 5–8)
PLATELET COUNT: 176 THOUSAND/UL (ref 140–400)
POTASSIUM: 4.3 MMOL/L (ref 3.5–5.3)
PROTEIN, TOTAL: 6.4 G/DL (ref 6.1–8.1)
PROTEIN: NEGATIVE
RDW: 11.9 % (ref 11–15)
RED BLOOD CELL COUNT: 4.74 MILLION/UL (ref 4.2–5.8)
SODIUM: 140 MMOL/L (ref 135–146)
SPECIFIC GRAVITY: 1.02 (ref 1–1.03)
T4, FREE: 1.1 NG/DL (ref 0.8–1.8)
TRIGLYCERIDES: 101 MG/DL
TSH W/REFLEX TO FT4: 4.66 MIU/L (ref 0.4–4.5)
WHITE BLOOD CELL COUNT: 5.6 THOUSAND/UL (ref 3.8–10.8)

## 2019-03-29 ENCOUNTER — TELEPHONE (OUTPATIENT)
Dept: INTERNAL MEDICINE CLINIC | Facility: CLINIC | Age: 64
End: 2019-03-29

## 2019-03-29 DIAGNOSIS — Z00.00 LABORATORY EXAMINATION ORDERED AS PART OF A COMPLETE PHYSICAL EXAMINATION: Primary | ICD-10-CM

## 2019-03-29 NOTE — TELEPHONE ENCOUNTER
Per pt request, please send PSA lab order to [address] - call once that order has been sent.

## 2019-03-29 NOTE — TELEPHONE ENCOUNTER
PSA screening entered for [address] [place] [address] lab. Message left for the pt per request/ [facility] informing of entered lab.

## 2019-03-30 LAB — PSA, TOTAL: <0.1 NG/ML

## 2019-04-05 RX ORDER — DABIGATRAN ETEXILATE 150 MG/1
150 CAPSULE ORAL
COMMUNITY
Start: 2018-02-21 | End: 2019-12-02

## 2019-04-05 RX ORDER — FLECAINIDE ACETATE 100 MG/1
100 TABLET ORAL
COMMUNITY
Start: 2018-07-09 | End: 2019-04-05 | Stop reason: SDUPTHER

## 2019-04-05 RX ORDER — ATORVASTATIN CALCIUM 20 MG/1
20 TABLET, FILM COATED ORAL
COMMUNITY
Start: 2018-06-05 | End: 2019-04-05 | Stop reason: SDUPTHER

## 2019-04-05 RX ORDER — MULTIVITAMIN
CAPSULE ORAL
COMMUNITY
Start: 2011-12-07

## 2019-04-05 RX ORDER — DOXYCYCLINE 40 MG/1
40 CAPSULE ORAL
COMMUNITY
Start: 2019-01-09 | End: 2019-12-02

## 2019-04-05 RX ORDER — ATORVASTATIN CALCIUM 20 MG/1
20 TABLET, FILM COATED ORAL DAILY
Qty: 90 TABLET | Refills: 3 | Status: SHIPPED | OUTPATIENT
Start: 2019-04-05 | End: 2020-02-13 | Stop reason: SDUPTHER

## 2019-04-05 RX ORDER — FLECAINIDE ACETATE 100 MG/1
100 TABLET ORAL 2 TIMES DAILY
Qty: 180 TABLET | Refills: 2 | Status: SHIPPED | OUTPATIENT
Start: 2019-04-05 | End: 2020-02-13 | Stop reason: SDUPTHER

## 2019-04-05 RX ORDER — TADALAFIL 20 MG/1
20 TABLET ORAL
COMMUNITY
Start: 2011-12-07

## 2019-04-26 ENCOUNTER — OFFICE VISIT (OUTPATIENT)
Dept: INTERNAL MEDICINE CLINIC | Facility: CLINIC | Age: 64
End: 2019-04-26
Payer: COMMERCIAL

## 2019-04-26 VITALS
HEIGHT: 75 IN | DIASTOLIC BLOOD PRESSURE: 64 MMHG | SYSTOLIC BLOOD PRESSURE: 102 MMHG | RESPIRATION RATE: 16 BRPM | BODY MASS INDEX: 26.36 KG/M2 | TEMPERATURE: 98 F | WEIGHT: 212 LBS | HEART RATE: 60 BPM

## 2019-04-26 DIAGNOSIS — M75.81 ROTATOR CUFF TENDONITIS, RIGHT: ICD-10-CM

## 2019-04-26 DIAGNOSIS — Z00.00 ANNUAL PHYSICAL EXAM: Primary | ICD-10-CM

## 2019-04-26 DIAGNOSIS — Z23 NEED FOR PNEUMOCOCCAL VACCINATION: ICD-10-CM

## 2019-04-26 PROBLEM — G89.29 CHRONIC LEFT SI JOINT PAIN: Status: RESOLVED | Noted: 2017-03-26 | Resolved: 2019-04-26

## 2019-04-26 PROBLEM — M54.50 CHRONIC BILATERAL LOW BACK PAIN WITHOUT SCIATICA: Status: RESOLVED | Noted: 2017-10-19 | Resolved: 2019-04-26

## 2019-04-26 PROBLEM — G89.29 CHRONIC BILATERAL LOW BACK PAIN WITHOUT SCIATICA: Status: RESOLVED | Noted: 2017-10-19 | Resolved: 2019-04-26

## 2019-04-26 PROBLEM — E03.8 SUBCLINICAL HYPOTHYROIDISM: Status: ACTIVE | Noted: 2019-04-26

## 2019-04-26 PROBLEM — M53.3 CHRONIC LEFT SI JOINT PAIN: Status: RESOLVED | Noted: 2017-03-26 | Resolved: 2019-04-26

## 2019-04-26 PROCEDURE — 90732 PPSV23 VACC 2 YRS+ SUBQ/IM: CPT | Performed by: INTERNAL MEDICINE

## 2019-04-26 PROCEDURE — 99397 PER PM REEVAL EST PAT 65+ YR: CPT | Performed by: INTERNAL MEDICINE

## 2019-04-26 PROCEDURE — 90471 IMMUNIZATION ADMIN: CPT | Performed by: INTERNAL MEDICINE

## 2019-04-26 RX ORDER — METHYLPREDNISOLONE 4 MG/1
TABLET ORAL
Qty: 1 KIT | Refills: 0 | Status: SHIPPED | OUTPATIENT
Start: 2019-04-26 | End: 2019-10-10 | Stop reason: ALTCHOICE

## 2019-04-26 NOTE — PROGRESS NOTES
HPI:    Patient ID: Carrol Nguyen is a 71year old male. HPI  Carrol Nguyen is a 71year old male who presents for a complete physical exam.   HPI:   Pt complains of R>L shoulder pain with activity for last few weeks. No hx of trauma.  otherwis methylPREDNISolone (MEDROL) 4 MG Oral Tablet Therapy Pack As directed. Disp: 1 kit Rfl: 0   Flecainide Acetate 100 MG Oral Tab Take 100 mg by mouth 2 (two) times daily. Disp:  Rfl:    Atorvastatin Calcium 20 MG Oral Tab Take 20 mg by mouth nightly.  Disp: 3/6/2003    excision of EIC on Lt upper back   • OTHER SURGICAL HISTORY  2/6/2003    biopsy of skin lesion from Lt neck      Family History   Problem Relation Age of Onset   • Hypertension Father         High blood pressure   • Macular degeneration Father or asthma    EXAM:   /64   Pulse 60   Temp 97.8 °F (36.6 °C) (Oral)   Resp 16   Ht 75\"   Wt 212 lb   BMI 26.50 kg/m²   Body mass index is 26.5 kg/m².    GENERAL: well developed, well nourished,in no apparent distress  SKIN: no rashes,no suspicious le nightly. Disp:  Rfl:    Multiple Vitamin (DAILY MARTÍNEZ) Oral Tab Take 1 tablet by mouth daily.  Disp:  Rfl:      Allergies:No Known Allergies   PHYSICAL EXAM:   Physical Exam           ASSESSMENT/PLAN:   Annual physical exam  (primary encounter diagnosis)  Ne

## 2019-10-10 ENCOUNTER — OFFICE VISIT (OUTPATIENT)
Dept: INTERNAL MEDICINE CLINIC | Facility: CLINIC | Age: 64
End: 2019-10-10
Payer: COMMERCIAL

## 2019-10-10 VITALS
TEMPERATURE: 98 F | HEIGHT: 75 IN | BODY MASS INDEX: 26.11 KG/M2 | WEIGHT: 210 LBS | OXYGEN SATURATION: 95 % | SYSTOLIC BLOOD PRESSURE: 120 MMHG | HEART RATE: 88 BPM | RESPIRATION RATE: 16 BRPM | DIASTOLIC BLOOD PRESSURE: 64 MMHG

## 2019-10-10 DIAGNOSIS — M53.3 BACK PAIN, SACROILIAC: Primary | ICD-10-CM

## 2019-10-10 PROCEDURE — 99213 OFFICE O/P EST LOW 20 MIN: CPT | Performed by: NURSE PRACTITIONER

## 2019-10-10 RX ORDER — PREDNISONE 20 MG/1
40 TABLET ORAL DAILY
Qty: 14 TABLET | Refills: 0 | Status: SHIPPED | OUTPATIENT
Start: 2019-10-10 | End: 2019-10-17

## 2019-10-10 RX ORDER — CYCLOBENZAPRINE HCL 10 MG
10 TABLET ORAL 3 TIMES DAILY PRN
Qty: 20 TABLET | Refills: 0 | Status: SHIPPED | OUTPATIENT
Start: 2019-10-10 | End: 2020-08-17

## 2019-10-10 NOTE — PROGRESS NOTES
HPI:    Patient ID: Jeannie Friend is a 71year old male. Patient presents with:  Back Pain: lower back, aggreivated as of yesterday.  pt states he took cyclobenzaprine 10mg this morning at 8am       Pain has slightly improved with flexeril, rates ab great toe   • Unspecified local infection of skin and subcutaneous tissue 2/2003     Past Surgical History:   Procedure Laterality Date   • APPENDECTOMY  8/1991   • CARDIOVERSION  2011, 2015, 2016, 2017   • COLONOSCOPY  5/7/2004    to cecum   • ELECTROCARD drinks      Drug use: No    Other Topics      Concerns:        Seat Belt: No        Special Diet: No           Current Outpatient Medications:  predniSONE 20 MG Oral Tab Take 2 tablets (40 mg total) by mouth daily for 7 days.  Disp: 14 tablet Rfl: 0   Cyclo for:  FOLIC, FOLATESER, FOLATE  No results found for: IRON, IRONTOT  No results found for: B12, VITB12  Lab Results   Component Value Date    PHOS 2.3 (L) 01/16/2017     Lab Results   Component Value Date    MG 2.1 04/09/2017        PHYSICAL EXAM:   /

## 2019-12-02 ENCOUNTER — OFFICE VISIT (OUTPATIENT)
Dept: CARDIOLOGY | Age: 64
End: 2019-12-02

## 2019-12-02 ENCOUNTER — MED REC SCAN ONLY (OUTPATIENT)
Dept: INTERNAL MEDICINE CLINIC | Facility: CLINIC | Age: 64
End: 2019-12-02

## 2019-12-02 VITALS
BODY MASS INDEX: 27.7 KG/M2 | WEIGHT: 209 LBS | DIASTOLIC BLOOD PRESSURE: 80 MMHG | SYSTOLIC BLOOD PRESSURE: 120 MMHG | HEIGHT: 73 IN | HEART RATE: 67 BPM

## 2019-12-02 DIAGNOSIS — I48.0 PAROXYSMAL ATRIAL FIBRILLATION (CMD): ICD-10-CM

## 2019-12-02 DIAGNOSIS — Z51.81 ENCOUNTER FOR MONITORING FLECAINIDE THERAPY: Primary | ICD-10-CM

## 2019-12-02 DIAGNOSIS — Z79.899 ENCOUNTER FOR MONITORING FLECAINIDE THERAPY: Primary | ICD-10-CM

## 2019-12-02 PROCEDURE — 99213 OFFICE O/P EST LOW 20 MIN: CPT | Performed by: INTERNAL MEDICINE

## 2019-12-02 PROCEDURE — 93000 ELECTROCARDIOGRAM COMPLETE: CPT | Performed by: INTERNAL MEDICINE

## 2019-12-02 ASSESSMENT — PATIENT HEALTH QUESTIONNAIRE - PHQ9
1. LITTLE INTEREST OR PLEASURE IN DOING THINGS: NOT AT ALL
SUM OF ALL RESPONSES TO PHQ9 QUESTIONS 1 AND 2: 0
SUM OF ALL RESPONSES TO PHQ9 QUESTIONS 1 AND 2: 0
2. FEELING DOWN, DEPRESSED OR HOPELESS: NOT AT ALL

## 2020-02-11 ENCOUNTER — TELEPHONE (OUTPATIENT)
Dept: CARDIOLOGY | Age: 65
End: 2020-02-11

## 2020-02-14 RX ORDER — ATORVASTATIN CALCIUM 20 MG/1
TABLET, FILM COATED ORAL
Qty: 90 TABLET | Refills: 3 | Status: SHIPPED | OUTPATIENT
Start: 2020-02-14 | End: 2020-12-02 | Stop reason: SDUPTHER

## 2020-02-14 RX ORDER — FLECAINIDE ACETATE 100 MG/1
TABLET ORAL
Qty: 180 TABLET | Refills: 3 | Status: SHIPPED | OUTPATIENT
Start: 2020-02-14 | End: 2020-12-02 | Stop reason: SDUPTHER

## 2020-07-31 ENCOUNTER — TELEPHONE (OUTPATIENT)
Dept: INTERNAL MEDICINE CLINIC | Facility: CLINIC | Age: 65
End: 2020-07-31

## 2020-07-31 DIAGNOSIS — Z00.00 ANNUAL PHYSICAL EXAM: Primary | ICD-10-CM

## 2020-07-31 NOTE — TELEPHONE ENCOUNTER
Discharge instructions reviewed with patient/responsible adult and understanding verbalized. Discharge instructions signed and copies given. Patient discharged home with belongings.   Lens implant ID card Orders placed for annual visit.

## 2020-08-08 LAB
ABSOLUTE BASOPHILS: 61 CELLS/UL (ref 0–200)
ABSOLUTE EOSINOPHILS: 232 CELLS/UL (ref 15–500)
ABSOLUTE LYMPHOCYTES: 1867 CELLS/UL (ref 850–3900)
ABSOLUTE MONOCYTES: 439 CELLS/UL (ref 200–950)
ABSOLUTE NEUTROPHILS: 3501 CELLS/UL (ref 1500–7800)
ALBUMIN/GLOBULIN RATIO: 2.2 (CALC) (ref 1–2.5)
ALBUMIN: 4.6 G/DL (ref 3.6–5.1)
ALKALINE PHOSPHATASE: 68 U/L (ref 35–144)
ALT: 14 U/L (ref 9–46)
APPEARANCE: CLEAR
AST: 21 U/L (ref 10–35)
BASOPHILS: 1 %
BILIRUBIN, TOTAL: 0.8 MG/DL (ref 0.2–1.2)
BILIRUBIN: NEGATIVE
BUN/CREATININE RATIO: 16 (CALC) (ref 6–22)
BUN: 21 MG/DL (ref 7–25)
CALCIUM: 9.9 MG/DL (ref 8.6–10.3)
CARBON DIOXIDE: 32 MMOL/L (ref 20–32)
CHLORIDE: 102 MMOL/L (ref 98–110)
CHOL/HDLC RATIO: 2.4 (CALC)
CHOLESTEROL, TOTAL: 141 MG/DL
COLOR: YELLOW
CREATININE: 1.3 MG/DL (ref 0.7–1.25)
EGFR IF AFRICN AM: 66 ML/MIN/1.73M2
EGFR IF NONAFRICN AM: 57 ML/MIN/1.73M2
EOSINOPHILS: 3.8 %
GLOBULIN: 2.1 G/DL (CALC) (ref 1.9–3.7)
GLUCOSE: 93 MG/DL (ref 65–99)
GLUCOSE: NEGATIVE
HDL CHOLESTEROL: 58 MG/DL
HEMATOCRIT: 50.5 % (ref 38.5–50)
HEMOGLOBIN A1C: 5 % OF TOTAL HGB
HEMOGLOBIN: 16.9 G/DL (ref 13.2–17.1)
KETONES: NEGATIVE
LDL-CHOLESTEROL: 69 MG/DL (CALC)
LEUKOCYTE ESTERASE: NEGATIVE
LYMPHOCYTES: 30.6 %
MCH: 32.9 PG (ref 27–33)
MCHC: 33.5 G/DL (ref 32–36)
MCV: 98.4 FL (ref 80–100)
MONOCYTES: 7.2 %
MPV: 9.2 FL (ref 7.5–12.5)
NEUTROPHILS: 57.4 %
NITRITE: NEGATIVE
NON-HDL CHOLESTEROL: 83 MG/DL (CALC)
OCCULT BLOOD: NEGATIVE
PH: 7.5 (ref 5–8)
PLATELET COUNT: 175 THOUSAND/UL (ref 140–400)
POTASSIUM: 4.7 MMOL/L (ref 3.5–5.3)
PROTEIN, TOTAL: 6.7 G/DL (ref 6.1–8.1)
PROTEIN: NEGATIVE
PSA, TOTAL: <0.1 NG/ML
RDW: 11.8 % (ref 11–15)
RED BLOOD CELL COUNT: 5.13 MILLION/UL (ref 4.2–5.8)
SODIUM: 140 MMOL/L (ref 135–146)
SPECIFIC GRAVITY: 1.01 (ref 1–1.03)
T4, FREE: 1.3 NG/DL (ref 0.8–1.8)
TRIGLYCERIDES: 62 MG/DL
TSH W/REFLEX TO FT4: 4.51 MIU/L (ref 0.4–4.5)
VITAMIN D, 25-OH, TOTAL: 50 NG/ML (ref 30–100)
WHITE BLOOD CELL COUNT: 6.1 THOUSAND/UL (ref 3.8–10.8)

## 2020-08-17 ENCOUNTER — OFFICE VISIT (OUTPATIENT)
Dept: INTERNAL MEDICINE CLINIC | Facility: CLINIC | Age: 65
End: 2020-08-17
Payer: COMMERCIAL

## 2020-08-17 VITALS
RESPIRATION RATE: 16 BRPM | TEMPERATURE: 98 F | WEIGHT: 195 LBS | DIASTOLIC BLOOD PRESSURE: 68 MMHG | OXYGEN SATURATION: 98 % | BODY MASS INDEX: 24.25 KG/M2 | SYSTOLIC BLOOD PRESSURE: 116 MMHG | HEIGHT: 75 IN | HEART RATE: 72 BPM

## 2020-08-17 DIAGNOSIS — Z13.6 ENCOUNTER FOR ABDOMINAL AORTIC ANEURYSM (AAA) SCREENING: ICD-10-CM

## 2020-08-17 DIAGNOSIS — Z23 NEED FOR VACCINATION: ICD-10-CM

## 2020-08-17 DIAGNOSIS — Z00.00 ANNUAL PHYSICAL EXAM: Primary | ICD-10-CM

## 2020-08-17 PROCEDURE — 99397 PER PM REEVAL EST PAT 65+ YR: CPT | Performed by: INTERNAL MEDICINE

## 2020-08-17 PROCEDURE — 3074F SYST BP LT 130 MM HG: CPT | Performed by: INTERNAL MEDICINE

## 2020-08-17 PROCEDURE — 3008F BODY MASS INDEX DOCD: CPT | Performed by: INTERNAL MEDICINE

## 2020-08-17 PROCEDURE — 90670 PCV13 VACCINE IM: CPT | Performed by: INTERNAL MEDICINE

## 2020-08-17 PROCEDURE — 90471 IMMUNIZATION ADMIN: CPT | Performed by: INTERNAL MEDICINE

## 2020-08-17 PROCEDURE — 3078F DIAST BP <80 MM HG: CPT | Performed by: INTERNAL MEDICINE

## 2020-08-17 NOTE — PROGRESS NOTES
HPI:    Patient ID: Lorenza Dlalas is a 76year old male.     HPI  Lorenza Dallas is a 76year old male who presents for a complete physical exam.   HPI:   Pt reports normal state of health  Denies cp or sob  Tolerating med    PAST MEDICAL, SOCIAL, Lab Results   Component Value Date    ALT 14 08/07/2020    ALT 14 03/26/2019    ALT 17 05/10/2018     No results found for: PSA     Current Outpatient Medications   Medication Sig Dispense Refill   • Flecainide Acetate 100 MG Oral Tab Take 100 mg by mo raidcal prostatectomy & B/L pelvic lymph node dissection   • OTHER SURGICAL HISTORY  3/6/2003    excision of EIC on Lt upper back   • OTHER SURGICAL HISTORY  2/6/2003    biopsy of skin lesion from Lt neck      Family History   Problem Relation Age of Onset depression or anxiety  HEMATOLOGIC: denies hx of anemia  ENDOCRINE: denies thyroid history  ALL/ASTHMA: denies hx of allergy or asthma    EXAM:   /68   Pulse 72   Temp 97.9 °F (36.6 °C) (Temporal)   Resp 16   Ht 75\"   Wt 195 lb (88.5 kg)   SpO2 98% ASSESSMENT/PLAN:   Annual physical exam  (primary encounter diagnosis)  Need for vaccination  Encounter for abdominal aortic aneurysm (aaa) screening    Orders Placed This Encounter      Pneumococcal (Prevnar 15) (71830) (DX V03.82/Z23)      Meds This Vi

## 2020-10-04 ENCOUNTER — MOBILE ENCOUNTER (OUTPATIENT)
Dept: INTERNAL MEDICINE CLINIC | Facility: CLINIC | Age: 65
End: 2020-10-04

## 2020-10-04 RX ORDER — PREDNISONE 20 MG/1
40 TABLET ORAL DAILY
Qty: 14 TABLET | Refills: 0 | Status: SHIPPED | OUTPATIENT
Start: 2020-10-04 | End: 2022-01-19

## 2020-10-23 ENCOUNTER — HOSPITAL ENCOUNTER (OUTPATIENT)
Dept: ULTRASOUND IMAGING | Age: 65
Discharge: HOME | End: 2020-10-23
Attending: INTERNAL MEDICINE
Payer: COMMERCIAL

## 2020-10-23 DIAGNOSIS — Z13.6 ENCOUNTER FOR ABDOMINAL AORTIC ANEURYSM (AAA) SCREENING: ICD-10-CM

## 2020-10-23 PROBLEM — I77.89 ECTASIA OF ARTERY: Status: ACTIVE | Noted: 2020-10-23

## 2020-10-23 PROBLEM — I77.89 ECTASIA OF ARTERY (HCC): Status: ACTIVE | Noted: 2020-10-23

## 2020-10-23 PROCEDURE — 76706 US ABDL AORTA SCREEN AAA: CPT | Performed by: INTERNAL MEDICINE

## 2020-11-25 ENCOUNTER — TELEMEDICINE (OUTPATIENT)
Dept: INTERNAL MEDICINE CLINIC | Facility: CLINIC | Age: 65
End: 2020-11-25

## 2020-11-25 DIAGNOSIS — R53.83 OTHER FATIGUE: Primary | ICD-10-CM

## 2020-11-25 PROCEDURE — 99213 OFFICE O/P EST LOW 20 MIN: CPT | Performed by: NURSE PRACTITIONER

## 2020-11-25 NOTE — PROGRESS NOTES
HPI:    Patient ID: Manon Simmonds is a 76year old male. Manon Simmonds is a 76year old male    This visit is conducted using Telemedicine with live, interactive video and audio. HPI  Pt is c/o generalized fatigue.  Occasionally feeling \"leg

## 2020-11-25 NOTE — PROGRESS NOTES
Patient was seen and examined by me as well as APN student. Agree with assessment and plan.    KIN Claros

## 2020-11-30 ENCOUNTER — TELEPHONE (OUTPATIENT)
Dept: INTERNAL MEDICINE CLINIC | Facility: CLINIC | Age: 65
End: 2020-11-30

## 2020-11-30 NOTE — TELEPHONE ENCOUNTER
Patient reports, \"I have some fatigue but it gets better throughout the day. I have more bouts of energy throughout the day. I am not 100% but I seem to be progressing. I will call if anything changed. \"    Understanding was expressed.

## 2020-12-02 ENCOUNTER — OFFICE VISIT (OUTPATIENT)
Dept: CARDIOLOGY | Age: 65
End: 2020-12-02

## 2020-12-02 VITALS
WEIGHT: 196 LBS | BODY MASS INDEX: 25.98 KG/M2 | SYSTOLIC BLOOD PRESSURE: 100 MMHG | HEIGHT: 73 IN | DIASTOLIC BLOOD PRESSURE: 62 MMHG | HEART RATE: 60 BPM

## 2020-12-02 DIAGNOSIS — Z51.81 ENCOUNTER FOR MONITORING FLECAINIDE THERAPY: Primary | ICD-10-CM

## 2020-12-02 DIAGNOSIS — I48.0 PAROXYSMAL ATRIAL FIBRILLATION (CMD): ICD-10-CM

## 2020-12-02 DIAGNOSIS — Z79.899 ENCOUNTER FOR MONITORING FLECAINIDE THERAPY: Primary | ICD-10-CM

## 2020-12-02 PROCEDURE — 99213 OFFICE O/P EST LOW 20 MIN: CPT | Performed by: INTERNAL MEDICINE

## 2020-12-02 PROCEDURE — 93000 ELECTROCARDIOGRAM COMPLETE: CPT | Performed by: INTERNAL MEDICINE

## 2020-12-02 RX ORDER — FLECAINIDE ACETATE 100 MG/1
100 TABLET ORAL 2 TIMES DAILY
Qty: 180 TABLET | Refills: 1 | Status: SHIPPED | OUTPATIENT
Start: 2020-12-02 | End: 2021-06-07 | Stop reason: SDUPTHER

## 2020-12-02 RX ORDER — ATORVASTATIN CALCIUM 20 MG/1
20 TABLET, FILM COATED ORAL DAILY
Qty: 90 TABLET | Refills: 4 | Status: SHIPPED | OUTPATIENT
Start: 2020-12-02 | End: 2021-06-07 | Stop reason: SDUPTHER

## 2020-12-02 SDOH — HEALTH STABILITY: MENTAL HEALTH: HOW OFTEN DO YOU HAVE A DRINK CONTAINING ALCOHOL?: NEVER

## 2020-12-02 SDOH — HEALTH STABILITY: PHYSICAL HEALTH: ON AVERAGE, HOW MANY MINUTES DO YOU ENGAGE IN EXERCISE AT THIS LEVEL?: 30 MIN

## 2020-12-02 SDOH — HEALTH STABILITY: PHYSICAL HEALTH: ON AVERAGE, HOW MANY DAYS PER WEEK DO YOU ENGAGE IN MODERATE TO STRENUOUS EXERCISE (LIKE A BRISK WALK)?: 7 DAYS

## 2020-12-02 ASSESSMENT — PATIENT HEALTH QUESTIONNAIRE - PHQ9
SUM OF ALL RESPONSES TO PHQ9 QUESTIONS 1 AND 2: 0
SUM OF ALL RESPONSES TO PHQ9 QUESTIONS 1 AND 2: 0
1. LITTLE INTEREST OR PLEASURE IN DOING THINGS: NOT AT ALL
CLINICAL INTERPRETATION OF PHQ2 SCORE: NO FURTHER SCREENING NEEDED
2. FEELING DOWN, DEPRESSED OR HOPELESS: NOT AT ALL
CLINICAL INTERPRETATION OF PHQ9 SCORE: NO FURTHER SCREENING NEEDED

## 2020-12-31 ENCOUNTER — OFFICE VISIT (OUTPATIENT)
Dept: PODIATRY CLINIC | Facility: CLINIC | Age: 65
End: 2020-12-31
Payer: COMMERCIAL

## 2020-12-31 DIAGNOSIS — G57.63 MORTON'S NEUROMA OF BOTH FEET: ICD-10-CM

## 2020-12-31 DIAGNOSIS — M77.41 METATARSALGIA OF BOTH FEET: Primary | ICD-10-CM

## 2020-12-31 DIAGNOSIS — M77.42 METATARSALGIA OF BOTH FEET: Primary | ICD-10-CM

## 2020-12-31 PROCEDURE — 99070 SPECIAL SUPPLIES PHYS/QHP: CPT | Performed by: PODIATRIST

## 2020-12-31 PROCEDURE — 99203 OFFICE O/P NEW LOW 30 MIN: CPT | Performed by: PODIATRIST

## 2020-12-31 NOTE — PROGRESS NOTES
Afshan Ocasio is a 76year old male. Patient presents with:  New Patient: last seen years ago - hx of plantar fasciitis and neuroma - feeling numbness augustine feet - denies any pain.         HPI:     A with new onset pain in his left foot with some numbnes 2/2003      Past Surgical History:   Procedure Laterality Date   • APPENDECTOMY  8/1991   • CARDIOVERSION  2011, 2015, 2016, 2017   • COLONOSCOPY  5/7/2004    to cecum   • ELECTROCARDIOGRAM, COMPLETE  12/6/2013   • NDSC ABLATION & RCNSTJ ATRIA LMTD W/O BYP No        Special Diet: No          REVIEW OF SYSTEMS:   Review of Systems    Today reviewed systems as documented below  GENERAL HEALTH: feels well otherwise  SKIN: denies any unusual skin lesions or rashes  RESPIRATORY: denies shortness of breath with ex

## 2021-02-01 ENCOUNTER — OFFICE VISIT (OUTPATIENT)
Dept: PODIATRY CLINIC | Facility: CLINIC | Age: 66
End: 2021-02-01
Payer: COMMERCIAL

## 2021-02-01 DIAGNOSIS — G57.63 MORTON'S NEUROMA OF BOTH FEET: ICD-10-CM

## 2021-02-01 DIAGNOSIS — M77.41 METATARSALGIA OF BOTH FEET: Primary | ICD-10-CM

## 2021-02-01 DIAGNOSIS — M77.42 METATARSALGIA OF BOTH FEET: Primary | ICD-10-CM

## 2021-02-01 PROCEDURE — 99213 OFFICE O/P EST LOW 20 MIN: CPT | Performed by: PODIATRIST

## 2021-02-01 NOTE — PROGRESS NOTES
Artemio Best is a 78year old male. Patient presents with: Follow - Up: fu on numbness on augustine feet - redithotics didn't help much.         HPI:     He presents today still having numbness in both feet right worse than left worse with shoes then witho Seasonal allergies    • Sleep apnea    • Splenic cyst 5/2011    lower pole splenic cyst   • Tendinopathy of left rotator cuff 2007   • Thoracic outlet syndrome 2009   • Toe fracture, right 1/1999    fracture of lateral aspect of base of distal phalanges of on file      Years of education: Not on file      Highest education level: Not on file    Tobacco Use      Smoking status: Never Smoker      Smokeless tobacco: Never Used    Substance and Sexual Activity      Alcohol use: No        Alcohol/week: 0.0 jt indicates understanding of these issues and agrees to the plan. No follow-ups on file.     Noe Carrillo DPM  2/1/2021

## 2021-06-07 ENCOUNTER — OFFICE VISIT (OUTPATIENT)
Dept: CARDIOLOGY | Age: 66
End: 2021-06-07

## 2021-06-07 ENCOUNTER — MED REC SCAN ONLY (OUTPATIENT)
Dept: INTERNAL MEDICINE CLINIC | Facility: CLINIC | Age: 66
End: 2021-06-07

## 2021-06-07 VITALS
DIASTOLIC BLOOD PRESSURE: 50 MMHG | WEIGHT: 196 LBS | HEART RATE: 62 BPM | BODY MASS INDEX: 25.98 KG/M2 | HEIGHT: 73 IN | SYSTOLIC BLOOD PRESSURE: 80 MMHG

## 2021-06-07 DIAGNOSIS — Z51.81 ENCOUNTER FOR MONITORING FLECAINIDE THERAPY: ICD-10-CM

## 2021-06-07 DIAGNOSIS — Z79.899 ENCOUNTER FOR MONITORING FLECAINIDE THERAPY: ICD-10-CM

## 2021-06-07 DIAGNOSIS — I48.0 PAROXYSMAL ATRIAL FIBRILLATION (CMD): Primary | ICD-10-CM

## 2021-06-07 PROCEDURE — 99213 OFFICE O/P EST LOW 20 MIN: CPT | Performed by: INTERNAL MEDICINE

## 2021-06-07 RX ORDER — ATORVASTATIN CALCIUM 20 MG/1
20 TABLET, FILM COATED ORAL DAILY
Qty: 90 TABLET | Refills: 0 | Status: SHIPPED | OUTPATIENT
Start: 2021-06-07

## 2021-06-07 RX ORDER — FLECAINIDE ACETATE 100 MG/1
100 TABLET ORAL 2 TIMES DAILY
Qty: 180 TABLET | Refills: 1 | Status: SHIPPED | OUTPATIENT
Start: 2021-06-07

## 2021-07-15 ENCOUNTER — TELEPHONE (OUTPATIENT)
Dept: INTERNAL MEDICINE CLINIC | Facility: CLINIC | Age: 66
End: 2021-07-15

## 2021-07-15 DIAGNOSIS — Z01.89 ENCOUNTER FOR LABORATORY EXAMINATION: Primary | ICD-10-CM

## 2021-07-20 LAB
ABSOLUTE BASOPHILS: 59 CELLS/UL (ref 0–200)
ABSOLUTE EOSINOPHILS: 130 CELLS/UL (ref 15–500)
ABSOLUTE LYMPHOCYTES: 1977 CELLS/UL (ref 850–3900)
ABSOLUTE MONOCYTES: 443 CELLS/UL (ref 200–950)
ABSOLUTE NEUTROPHILS: 3292 CELLS/UL (ref 1500–7800)
ALBUMIN/GLOBULIN RATIO: 1.8 (CALC) (ref 1–2.5)
ALBUMIN: 4.2 G/DL (ref 3.6–5.1)
ALKALINE PHOSPHATASE: 61 U/L (ref 35–144)
ALT: 18 U/L (ref 9–46)
APPEARANCE: CLEAR
AST: 23 U/L (ref 10–35)
BASOPHILS: 1 %
BILIRUBIN, TOTAL: 1.1 MG/DL (ref 0.2–1.2)
BILIRUBIN: NEGATIVE
BUN: 24 MG/DL (ref 7–25)
CALCIUM: 9.4 MG/DL (ref 8.6–10.3)
CARBON DIOXIDE: 29 MMOL/L (ref 20–32)
CHLORIDE: 105 MMOL/L (ref 98–110)
CHOL/HDLC RATIO: 2.7 (CALC)
CHOLESTEROL, TOTAL: 141 MG/DL
COLOR: YELLOW
CREATININE: 1.23 MG/DL (ref 0.7–1.25)
EGFR IF AFRICN AM: 70 ML/MIN/1.73M2
EGFR IF NONAFRICN AM: 60 ML/MIN/1.73M2
EOSINOPHILS: 2.2 %
GLOBULIN: 2.3 G/DL (CALC) (ref 1.9–3.7)
GLUCOSE: 80 MG/DL (ref 65–99)
GLUCOSE: NEGATIVE
HDL CHOLESTEROL: 52 MG/DL
HEMATOCRIT: 48.7 % (ref 38.5–50)
HEMOGLOBIN A1C: 4.9 % OF TOTAL HGB
HEMOGLOBIN: 16 G/DL (ref 13.2–17.1)
KETONES: NEGATIVE
LDL-CHOLESTEROL: 71 MG/DL (CALC)
LEUKOCYTE ESTERASE: NEGATIVE
LYMPHOCYTES: 33.5 %
MCH: 32.5 PG (ref 27–33)
MCHC: 32.9 G/DL (ref 32–36)
MCV: 99 FL (ref 80–100)
MONOCYTES: 7.5 %
MPV: 9.6 FL (ref 7.5–12.5)
NEUTROPHILS: 55.8 %
NITRITE: NEGATIVE
NON-HDL CHOLESTEROL: 89 MG/DL (CALC)
OCCULT BLOOD: NEGATIVE
PH: 6.5 (ref 5–8)
PLATELET COUNT: 166 THOUSAND/UL (ref 140–400)
POTASSIUM: 4.2 MMOL/L (ref 3.5–5.3)
PROTEIN, TOTAL: 6.5 G/DL (ref 6.1–8.1)
PROTEIN: NEGATIVE
RDW: 12 % (ref 11–15)
RED BLOOD CELL COUNT: 4.92 MILLION/UL (ref 4.2–5.8)
SODIUM: 142 MMOL/L (ref 135–146)
SPECIFIC GRAVITY: 1.01 (ref 1–1.03)
TOTAL PSA: <0.1 NG/ML
TRIGLYCERIDES: 92 MG/DL
TSH: 4.71 MIU/L (ref 0.4–4.5)
WHITE BLOOD CELL COUNT: 5.9 THOUSAND/UL (ref 3.8–10.8)

## 2021-07-27 ENCOUNTER — OFFICE VISIT (OUTPATIENT)
Dept: INTERNAL MEDICINE CLINIC | Facility: CLINIC | Age: 66
End: 2021-07-27
Payer: COMMERCIAL

## 2021-07-27 VITALS
RESPIRATION RATE: 16 BRPM | TEMPERATURE: 99 F | DIASTOLIC BLOOD PRESSURE: 64 MMHG | SYSTOLIC BLOOD PRESSURE: 120 MMHG | OXYGEN SATURATION: 99 % | BODY MASS INDEX: 25.12 KG/M2 | WEIGHT: 202 LBS | HEART RATE: 66 BPM | HEIGHT: 75 IN

## 2021-07-27 DIAGNOSIS — Z12.11 COLON CANCER SCREENING: ICD-10-CM

## 2021-07-27 DIAGNOSIS — Z00.00 ANNUAL PHYSICAL EXAM: Primary | ICD-10-CM

## 2021-07-27 PROCEDURE — 3074F SYST BP LT 130 MM HG: CPT | Performed by: INTERNAL MEDICINE

## 2021-07-27 PROCEDURE — 3078F DIAST BP <80 MM HG: CPT | Performed by: INTERNAL MEDICINE

## 2021-07-27 PROCEDURE — 99397 PER PM REEVAL EST PAT 65+ YR: CPT | Performed by: INTERNAL MEDICINE

## 2021-07-27 PROCEDURE — 3008F BODY MASS INDEX DOCD: CPT | Performed by: INTERNAL MEDICINE

## 2021-07-27 NOTE — PROGRESS NOTES
HPI/Subjective:   Patient ID: Veena Flores is a 78year old male. HPI  Veena Flores is a 78year old male who presents for a complete physical exam.   HPI:   Pt complains of nothing new.  Normal state of health    PAST MEDICAL, SOCIAL, FAMILY Date    LDL 71 07/19/2021    LDL 69 08/07/2020    LDL 68 03/26/2019     Lab Results   Component Value Date    AST 23 07/19/2021    AST 21 08/07/2020    AST 17 03/26/2019     Lab Results   Component Value Date    ALT 18 07/19/2021    ALT 14 08/07/2020    AL • ELECTROCARDIOGRAM, COMPLETE  12/6/2013   • NDSC ABLATION & RCNSTJ ATRIA LMTD W/O BYPASS     • OTHER SURGICAL HISTORY  6/29/2011    robotic-assisted laparoscopic raidcal prostatectomy & B/L pelvic lymph node dissection   • OTHER SURGICAL HISTORY  3/6/20 exertion  CARDIOVASCULAR: denies chest pain on exertion  GI: denies abdominal pain,denies heartburn  : denies nocturia or changes in stream  MUSCULOSKELETAL: denies back pain  NEURO: denies headaches  PSYCHE: denies depression or anxiety  HEMATOLOGIC: de mg by mouth nightly. • Multiple Vitamin (DAILY MARTÍNEZ) Oral Tab Take 1 tablet by mouth daily.        Allergies:No Known Allergies    Objective:   Physical Exam    Assessment & Plan:   Annual physical exam  (primary encounter diagnosis)  Colon cancer pancho

## 2021-08-24 ENCOUNTER — TELEPHONE (OUTPATIENT)
Dept: INTERNAL MEDICINE CLINIC | Facility: CLINIC | Age: 66
End: 2021-08-24

## 2021-08-24 DIAGNOSIS — Z00.00 LABORATORY EXAMINATION ORDERED AS PART OF A ROUTINE GENERAL MEDICAL EXAMINATION: Primary | ICD-10-CM

## 2021-08-24 NOTE — TELEPHONE ENCOUNTER
Pt states insurance is denying payment for labs done for annual physical due to incorrect diagnostic code, denial states \"Diagnostic code does not have enough information to determine why the test is medically necessary\".     [facility] Account # [id number]

## 2021-09-24 NOTE — TELEPHONE ENCOUNTER
Spoke with KOSTAS QUINTANILLA @ [employer] and dx code changed from Z01.89 to Z00.00. She will resubmit to insurance and this will take 35-45 business day. Patient notified via [address] [place] [address] [address].

## 2021-10-19 ENCOUNTER — MED REC SCAN ONLY (OUTPATIENT)
Dept: INTERNAL MEDICINE CLINIC | Facility: CLINIC | Age: 66
End: 2021-10-19

## 2021-12-05 ENCOUNTER — LAB ENCOUNTER (OUTPATIENT)
Dept: LAB | Facility: HOSPITAL | Age: 66
End: 2021-12-05
Attending: INTERNAL MEDICINE
Payer: COMMERCIAL

## 2021-12-05 DIAGNOSIS — Z01.818 PRE-OP TESTING: ICD-10-CM

## 2021-12-08 PROBLEM — Z86.0101 HISTORY OF ADENOMATOUS POLYP OF COLON: Status: ACTIVE | Noted: 2021-12-08

## 2021-12-08 PROBLEM — Z86.010 HISTORY OF ADENOMATOUS POLYP OF COLON: Status: ACTIVE | Noted: 2021-12-08

## 2022-01-19 ENCOUNTER — TELEPHONE (OUTPATIENT)
Dept: INTERNAL MEDICINE CLINIC | Facility: CLINIC | Age: 67
End: 2022-01-19

## 2022-01-19 DIAGNOSIS — M53.3 BACK PAIN, SACROILIAC: ICD-10-CM

## 2022-01-19 RX ORDER — CYCLOBENZAPRINE HCL 10 MG
10 TABLET ORAL 2 TIMES DAILY PRN
Qty: 14 TABLET | Refills: 0 | Status: SHIPPED | OUTPATIENT
Start: 2022-01-19 | End: 2022-01-26 | Stop reason: ALTCHOICE

## 2022-01-19 RX ORDER — PREDNISONE 20 MG/1
40 TABLET ORAL DAILY
Qty: 14 TABLET | Refills: 0 | Status: SHIPPED | OUTPATIENT
Start: 2022-01-19 | End: 2022-01-26 | Stop reason: ALTCHOICE

## 2022-01-19 NOTE — TELEPHONE ENCOUNTER
Spoke with pt reports lower back pain center of spine and radiates top of right buttocks started early evening yesterday. Per [address] Prednisone 40mg daily x 7 days, Flexeril 10mg BID PRN x 7days, f/u in office if symptoms continue.     D/w pt above

## 2022-01-19 NOTE — TELEPHONE ENCOUNTER
patient is having back spasms,starting yesterday afternoon. Patient is immobilized.   Patient was seen for this quite a while ago and give some medication by Stephanie Good 2019    Cyclobenzaprine HCl 10 MG Oral Tab  predniSONE 20 MG Oral Tab () 14 tablet 0 1

## 2022-01-26 ENCOUNTER — OFFICE VISIT (OUTPATIENT)
Dept: INTERNAL MEDICINE CLINIC | Facility: CLINIC | Age: 67
End: 2022-01-26
Payer: COMMERCIAL

## 2022-01-26 VITALS
HEIGHT: 75 IN | DIASTOLIC BLOOD PRESSURE: 74 MMHG | RESPIRATION RATE: 12 BRPM | BODY MASS INDEX: 25.29 KG/M2 | SYSTOLIC BLOOD PRESSURE: 112 MMHG | HEART RATE: 69 BPM | OXYGEN SATURATION: 96 % | WEIGHT: 203.38 LBS

## 2022-01-26 DIAGNOSIS — M46.1 SACROILIITIS (HCC): Primary | ICD-10-CM

## 2022-01-26 DIAGNOSIS — M53.3 BACK PAIN, SACROILIAC: ICD-10-CM

## 2022-01-26 PROCEDURE — 3078F DIAST BP <80 MM HG: CPT | Performed by: PHYSICIAN ASSISTANT

## 2022-01-26 PROCEDURE — 3008F BODY MASS INDEX DOCD: CPT | Performed by: PHYSICIAN ASSISTANT

## 2022-01-26 PROCEDURE — 3074F SYST BP LT 130 MM HG: CPT | Performed by: PHYSICIAN ASSISTANT

## 2022-01-26 PROCEDURE — 99213 OFFICE O/P EST LOW 20 MIN: CPT | Performed by: PHYSICIAN ASSISTANT

## 2022-01-26 RX ORDER — PREDNISONE 20 MG/1
40 TABLET ORAL DAILY
Qty: 14 TABLET | Refills: 0 | Status: SHIPPED | OUTPATIENT
Start: 2022-01-26 | End: 2022-02-02

## 2022-01-26 RX ORDER — CYCLOBENZAPRINE HCL 10 MG
10 TABLET ORAL 2 TIMES DAILY PRN
Qty: 14 TABLET | Refills: 0 | Status: SHIPPED | OUTPATIENT
Start: 2022-01-26

## 2022-01-26 NOTE — PATIENT INSTRUCTIONS
Start naproxen 220mg every morning. May increase to 440mg if needed.  Take with food  If flare-up on vacation, take prednisone and cyclobenzaprine as directed   Start physical therapy with Eleanor Michele from [place] [id number]

## 2022-01-26 NOTE — PROGRESS NOTES
Subjective:   Patient ID: Judi Calderon is a 75year old male. Back Pain  This is a recurrent problem. The current episode started in the past 7 days. The problem has been rapidly improving since onset.  Pertinent negatives include no fever, n on the left side.   Psychiatric:         Mood and Affect: Mood normal.         Behavior: Behavior normal.       /74   Pulse 69   Resp 12   Ht 6' 3\" (1.905 m)   Wt 203 lb 6.4 oz (92.3 kg)   SpO2 96%   BMI 25.42 kg/m²    Assessment & Plan:   [place]

## 2022-06-08 ENCOUNTER — APPOINTMENT (OUTPATIENT)
Dept: CARDIOLOGY | Age: 67
End: 2022-06-08

## 2022-07-19 ENCOUNTER — PATIENT MESSAGE (OUTPATIENT)
Dept: INTERNAL MEDICINE CLINIC | Facility: CLINIC | Age: 67
End: 2022-07-19

## 2022-07-19 DIAGNOSIS — Z00.00 ROUTINE GENERAL MEDICAL EXAMINATION AT A HEALTH CARE FACILITY: Primary | ICD-10-CM

## 2022-07-19 NOTE — TELEPHONE ENCOUNTER
From: Carine Rivero  To: Wayne Payne MD  Sent: 7/19/2022 11:34 AM CDT  Subject: Blood order for Annual Physical    I am scheduled for my annual physical on July 28. Could you please provide an order for the blood work (including a PSA test) related to my physical, so that I can have it done and receive the results before the physical appointment? I can come by and  the order or if you can submit it electronically I typically go to [facility] for my blood work. Thank you.

## 2022-07-19 NOTE — TELEPHONE ENCOUNTER
From: Paula Portal  Sent: 7/19/2022 1:21 PM CDT  To: [facility] Clinical Staff  Subject: Blood order for Annual Physical    Thank you!

## 2022-07-22 LAB
ABSOLUTE BASOPHILS: 48 CELLS/UL (ref 0–200)
ABSOLUTE EOSINOPHILS: 159 CELLS/UL (ref 15–500)
ABSOLUTE LYMPHOCYTES: 1367 CELLS/UL (ref 850–3900)
ABSOLUTE MONOCYTES: 398 CELLS/UL (ref 200–950)
ABSOLUTE NEUTROPHILS: 3328 CELLS/UL (ref 1500–7800)
ALBUMIN/GLOBULIN RATIO: 2.2 (CALC) (ref 1–2.5)
ALBUMIN: 4.3 G/DL (ref 3.6–5.1)
ALKALINE PHOSPHATASE: 61 U/L (ref 35–144)
ALT: 15 U/L (ref 9–46)
APPEARANCE: CLEAR
AST: 22 U/L (ref 10–35)
BASOPHILS: 0.9 %
BILIRUBIN, TOTAL: 1 MG/DL (ref 0.2–1.2)
BILIRUBIN: NEGATIVE
BUN: 21 MG/DL (ref 7–25)
CALCIUM: 9.3 MG/DL (ref 8.6–10.3)
CARBON DIOXIDE: 29 MMOL/L (ref 20–32)
CHLORIDE: 103 MMOL/L (ref 98–110)
CHOL/HDLC RATIO: 2.6 (CALC)
CHOLESTEROL, TOTAL: 130 MG/DL
COLOR: YELLOW
CREATININE: 1.22 MG/DL (ref 0.7–1.35)
EGFR: 65 ML/MIN/1.73M2
EOSINOPHILS: 3 %
GLOBULIN: 2 G/DL (CALC) (ref 1.9–3.7)
GLUCOSE: 89 MG/DL (ref 65–99)
GLUCOSE: NEGATIVE
HDL CHOLESTEROL: 50 MG/DL
HEMATOCRIT: 48.3 % (ref 38.5–50)
HEMOGLOBIN A1C: 5.1 % OF TOTAL HGB
HEMOGLOBIN: 16.1 G/DL (ref 13.2–17.1)
KETONES: NEGATIVE
LDL-CHOLESTEROL: 66 MG/DL (CALC)
LEUKOCYTE ESTERASE: NEGATIVE
LYMPHOCYTES: 25.8 %
MCH: 33.1 PG (ref 27–33)
MCHC: 33.3 G/DL (ref 32–36)
MCV: 99.2 FL (ref 80–100)
MONOCYTES: 7.5 %
MPV: 9.5 FL (ref 7.5–12.5)
NEUTROPHILS: 62.8 %
NITRITE: NEGATIVE
NON-HDL CHOLESTEROL: 80 MG/DL (CALC)
OCCULT BLOOD: NEGATIVE
PH: 7 (ref 5–8)
PLATELET COUNT: 162 THOUSAND/UL (ref 140–400)
POTASSIUM: 4.6 MMOL/L (ref 3.5–5.3)
PROTEIN, TOTAL: 6.3 G/DL (ref 6.1–8.1)
PROTEIN: NEGATIVE
RDW: 11.8 % (ref 11–15)
RED BLOOD CELL COUNT: 4.87 MILLION/UL (ref 4.2–5.8)
SODIUM: 139 MMOL/L (ref 135–146)
SPECIFIC GRAVITY: 1.01 (ref 1–1.03)
TOTAL PSA: <0.1 NG/ML
TRIGLYCERIDES: 67 MG/DL
TSH W/REFLEX TO FT4: 4.12 MIU/L (ref 0.4–4.5)
WHITE BLOOD CELL COUNT: 5.3 THOUSAND/UL (ref 3.8–10.8)

## 2022-07-28 ENCOUNTER — OFFICE VISIT (OUTPATIENT)
Dept: INTERNAL MEDICINE CLINIC | Facility: CLINIC | Age: 67
End: 2022-07-28
Payer: COMMERCIAL

## 2022-07-28 VITALS
DIASTOLIC BLOOD PRESSURE: 68 MMHG | BODY MASS INDEX: 24.87 KG/M2 | SYSTOLIC BLOOD PRESSURE: 116 MMHG | TEMPERATURE: 98 F | WEIGHT: 200 LBS | HEIGHT: 75 IN | OXYGEN SATURATION: 97 % | HEART RATE: 71 BPM | RESPIRATION RATE: 15 BRPM

## 2022-07-28 DIAGNOSIS — Z00.00 ANNUAL PHYSICAL EXAM: Primary | ICD-10-CM

## 2022-07-28 DIAGNOSIS — L73.9 FOLLICULITIS: ICD-10-CM

## 2022-07-28 PROCEDURE — 3074F SYST BP LT 130 MM HG: CPT | Performed by: INTERNAL MEDICINE

## 2022-07-28 PROCEDURE — 99397 PER PM REEVAL EST PAT 65+ YR: CPT | Performed by: INTERNAL MEDICINE

## 2022-07-28 PROCEDURE — 3078F DIAST BP <80 MM HG: CPT | Performed by: INTERNAL MEDICINE

## 2022-07-28 PROCEDURE — 3008F BODY MASS INDEX DOCD: CPT | Performed by: INTERNAL MEDICINE

## 2022-07-28 RX ORDER — DOXYCYCLINE HYCLATE 100 MG/1
100 CAPSULE ORAL 2 TIMES DAILY
Qty: 14 CAPSULE | Refills: 0 | Status: SHIPPED | OUTPATIENT
Start: 2022-07-28

## 2022-10-17 ENCOUNTER — MED REC SCAN ONLY (OUTPATIENT)
Dept: INTERNAL MEDICINE CLINIC | Facility: CLINIC | Age: 67
End: 2022-10-17

## 2023-07-31 ENCOUNTER — TELEPHONE (OUTPATIENT)
Dept: INTERNAL MEDICINE CLINIC | Facility: CLINIC | Age: 68
End: 2023-07-31

## 2023-07-31 NOTE — TELEPHONE ENCOUNTER
Patient calling back to reschedule cancelled appt   Patient's new appt is scheduled for 10/6/23  Patient would like to know if blood work done for original August appt will be good or does patient need labs redrawn closer to new appt time   Please call patient back and advise.

## 2023-09-12 ENCOUNTER — OFFICE VISIT (OUTPATIENT)
Dept: INTERNAL MEDICINE CLINIC | Facility: CLINIC | Age: 68
End: 2023-09-12
Payer: COMMERCIAL

## 2023-09-12 VITALS
HEIGHT: 75 IN | SYSTOLIC BLOOD PRESSURE: 122 MMHG | WEIGHT: 206 LBS | TEMPERATURE: 98 F | HEART RATE: 80 BPM | DIASTOLIC BLOOD PRESSURE: 70 MMHG | OXYGEN SATURATION: 98 % | BODY MASS INDEX: 25.61 KG/M2 | RESPIRATION RATE: 16 BRPM

## 2023-09-12 DIAGNOSIS — Z00.00 ANNUAL PHYSICAL EXAM: Primary | ICD-10-CM

## 2023-09-12 PROCEDURE — 3078F DIAST BP <80 MM HG: CPT | Performed by: INTERNAL MEDICINE

## 2023-09-12 PROCEDURE — 3008F BODY MASS INDEX DOCD: CPT | Performed by: INTERNAL MEDICINE

## 2023-09-12 PROCEDURE — 99397 PER PM REEVAL EST PAT 65+ YR: CPT | Performed by: INTERNAL MEDICINE

## 2023-09-12 PROCEDURE — 3074F SYST BP LT 130 MM HG: CPT | Performed by: INTERNAL MEDICINE

## 2023-11-14 ENCOUNTER — TELEMEDICINE (OUTPATIENT)
Dept: INTERNAL MEDICINE CLINIC | Facility: CLINIC | Age: 68
End: 2023-11-14
Payer: COMMERCIAL

## 2023-11-14 DIAGNOSIS — U07.1 COVID-19: Primary | ICD-10-CM

## 2023-11-14 PROCEDURE — 99213 OFFICE O/P EST LOW 20 MIN: CPT | Performed by: PHYSICIAN ASSISTANT

## 2023-11-14 RX ORDER — BENZONATATE 200 MG/1
200 CAPSULE ORAL 3 TIMES DAILY PRN
Qty: 20 CAPSULE | Refills: 0 | Status: SHIPPED | OUTPATIENT
Start: 2023-11-14

## 2023-11-14 NOTE — PATIENT INSTRUCTIONS
Stay isolated for 5 days total; then mask for 5 days; then if you are feeling better you can stop masking

## 2023-11-14 NOTE — PROGRESS NOTES
Maya Raya is a 70year old female. HPI:   This visit is conducted using Telemedicine with live, interactive video and audio. Patient consents to video visit today to discuss COVID + today  Symptoms began 2 days ago,   C/o body aches, but no fever. Headache, mild nasal congestion, dry cough now with some sputum production, worse with lying down  Taking tylenol, rest, fluids    Current Outpatient Medications   Medication Sig Dispense Refill    benzonatate 200 MG Oral Cap Take 1 capsule (200 mg total) by mouth 3 (three) times daily as needed for cough. 20 capsule 0    Flecainide Acetate 100 MG Oral Tab Take 1 tablet (100 mg total) by mouth 2 (two) times daily. Atorvastatin Calcium 20 MG Oral Tab Take 1 tablet (20 mg total) by mouth nightly. Multiple Vitamin (DAILY MARTÍNEZ) Oral Tab Take 1 tablet by mouth daily.         Past Medical History:   Diagnosis Date    Allergic rhinitis 20+ years ago    Some years worse than others    Anxiety May 2017    Atrial fibrillation ([address] 11/11    Back pain     Cancer [facility]) June 2011    Prostrate (had surgery)    Cholelithiasis 5/2011    Chronic hypotension 11/2011    typically asymptomatic    Cubital tunnel syndrome 3/2014    Diverticulosis 5/2011    EIC (epidermal inclusion cyst) 2003    L upper back s/p excision    Elevated prostate specific antigen (PSA) 2011    Environmental allergies     pollen, dust, trees, grass    Foreign body granuloma of skin and subcutaneous tissue 2/2003    Lt neck    H/O cardiovascular stress test 2012    Internal hemorrhoids 5/2004    Irregular bowel habits     Pain in joint 9/2014    Other specified sites    Paresthesia 3/2014    Prostate cancer ([facility] [address] 4/11    Renal cyst 5/2011    tiny Lt upper pole    Seasonal allergies     Sleep apnea     Splenic cyst 5/2011    lower pole splenic cyst    Stress     Tendinopathy of left rotator cuff 2007    Thoracic outlet syndrome 2009    Toe fracture, right 1/1999    fracture of lateral aspect of base of distal phalanges of Rt great toe    Unspecified local infection of skin and subcutaneous tissue 2/2003    Wears glasses       Social History:  Social History     Socioeconomic History    Marital status:    Tobacco Use    Smoking status: Never    Smokeless tobacco: Never   Vaping Use    Vaping Use: Never used   Substance and Sexual Activity    Alcohol use: No     Alcohol/week: 0.0 standard drinks of alcohol    Drug use: No   Other Topics Concern    Seat Belt No    Special Diet No        REVIEW OF SYSTEMS:   GENERAL HEALTH: feels well otherwise. Denies fever, chills, unintentional weight change  SKIN: denies any unusual skin lesions or rashes  RESPIRATORY: denies hemoptysis, shortness of breath with exertion, wheezing  CARDIOVASCULAR: denies chest pain or palpitations, denies leg swelling  GI: denies abdominal pain and denies heartburn. Denies nausea, vomiting, diarrhea, constipation  NEURO: denies headaches, dizziness, weakness, syncope    EXAM:   No vitals for video visit  Physical Exam  - well appearing via video visit  - no visible rash or diaphoresis  - no respiratory distress or cough observed  - able to speak in full sentences  - alert and oriented        ASSESSMENT AND PLAN:   1. COVID-19 (Primary)  Patient unable to take paxlovid due to interaction with flecainide  Symptoms mild at this time, hold off on molnupiravir treatment but will send it if symptoms fail to improve in the next 3 days  -     Benzonatate; Take 1 capsule (200 mg total) by mouth 3 (three) times daily as needed for cough. Dispense: 20 capsule; Refill: 0        The patient indicates understanding of these issues and agrees to the plan.

## 2023-11-20 ENCOUNTER — LAB ENCOUNTER (OUTPATIENT)
Dept: LAB | Age: 68
End: 2023-11-20
Attending: INTERNAL MEDICINE
Payer: COMMERCIAL

## 2023-11-20 ENCOUNTER — TELEPHONE (OUTPATIENT)
Dept: INTERNAL MEDICINE CLINIC | Facility: CLINIC | Age: 68
End: 2023-11-20

## 2023-11-20 ENCOUNTER — ORDER TRANSCRIPTION (OUTPATIENT)
Dept: ADMINISTRATIVE | Facility: HOSPITAL | Age: 68
End: 2023-11-20

## 2023-11-20 DIAGNOSIS — U07.1 COVID-19: ICD-10-CM

## 2023-11-20 DIAGNOSIS — U07.1 COVID-19: Primary | ICD-10-CM

## 2023-11-20 DIAGNOSIS — Z11.52 ENCOUNTER FOR SCREENING LABORATORY TESTING FOR COVID-19 VIRUS: Primary | ICD-10-CM

## 2023-11-20 PROCEDURE — 87635 SARS-COV-2 COVID-19 AMP PRB: CPT | Performed by: INTERNAL MEDICINE

## 2023-11-20 NOTE — TELEPHONE ENCOUNTER
PT HAS COVID & NEEDS ORDER FOR PCR COVID TEST TO BE PLACED.   NEEDS COVID TEST TO PROVE HE IN FACT HAS IT & CAN SHOW AIRLINES RESULTS TO GET TRAVEL REFUND.

## 2023-11-21 LAB — SARS-COV-2 RNA RESP QL NAA+PROBE: DETECTED

## 2023-11-24 ENCOUNTER — TELEPHONE (OUTPATIENT)
Dept: INTERNAL MEDICINE CLINIC | Facility: CLINIC | Age: 68
End: 2023-11-24

## 2023-11-24 NOTE — TELEPHONE ENCOUNTER
Pt calling with COVID update  He is still feeling fatigued, some cough and congestion. Pt advised to rest, continue supportive care. Advised against travel. Pt expresses understanding and agrees to the plan. He will contact us if new/worsening symptoms.

## 2024-08-27 ENCOUNTER — TELEPHONE (OUTPATIENT)
Dept: INTERNAL MEDICINE CLINIC | Facility: CLINIC | Age: 69
End: 2024-08-27

## 2024-08-27 DIAGNOSIS — Z00.00 LABORATORY EXAMINATION ORDERED AS PART OF A ROUTINE GENERAL MEDICAL EXAMINATION: Primary | ICD-10-CM

## 2024-08-27 NOTE — TELEPHONE ENCOUNTER
Lab orders placed for [facility].  Left message on voicemail to call office back to confirm which appointment we can cancel as patient has two upcoming.    Future Appointments   Date Time Provider Department Center   9/13/2024  1:30 PM Golden Fontenot MD [facility]   9/23/2024 11:30 AM Golden Fontenot MD [facility]

## 2024-09-05 LAB
ABSOLUTE BASOPHILS: 59 CELLS/UL (ref 0–200)
ABSOLUTE EOSINOPHILS: 238 CELLS/UL (ref 15–500)
ABSOLUTE LYMPHOCYTES: 1701 CELLS/UL (ref 850–3900)
ABSOLUTE MONOCYTES: 427 CELLS/UL (ref 200–950)
ABSOLUTE NEUTROPHILS: 2975 CELLS/UL (ref 1500–7800)
ALBUMIN/GLOBULIN RATIO: 1.9 (CALC) (ref 1–2.5)
ALBUMIN: 4.2 G/DL (ref 3.6–5.1)
ALKALINE PHOSPHATASE: 63 U/L (ref 35–144)
ALT: 18 U/L (ref 9–46)
APPEARANCE: CLEAR
AST: 21 U/L (ref 10–35)
BASOPHILS: 1.1 %
BILIRUBIN, TOTAL: 0.7 MG/DL (ref 0.2–1.2)
BILIRUBIN: NEGATIVE
BUN/CREATININE RATIO: 17 (CALC) (ref 6–22)
BUN: 24 MG/DL (ref 7–25)
CALCIUM: 9.4 MG/DL (ref 8.6–10.3)
CARBON DIOXIDE: 31 MMOL/L (ref 20–32)
CHLORIDE: 104 MMOL/L (ref 98–110)
CHOL/HDLC RATIO: 2.5 (CALC)
CHOLESTEROL, TOTAL: 131 MG/DL
COLOR: YELLOW
CREATININE: 1.41 MG/DL (ref 0.7–1.28)
EGFR: 54 ML/MIN/1.73M2
EOSINOPHILS: 4.4 %
GLOBULIN: 2.2 G/DL (CALC) (ref 1.9–3.7)
GLUCOSE: 89 MG/DL (ref 65–99)
GLUCOSE: NEGATIVE
HDL CHOLESTEROL: 53 MG/DL
HEMATOCRIT: 50.5 % (ref 38.5–50)
HEMOGLOBIN: 16.5 G/DL (ref 13.2–17.1)
KETONES: NEGATIVE
LDL-CHOLESTEROL: 64 MG/DL (CALC)
LEUKOCYTE ESTERASE: NEGATIVE
LYMPHOCYTES: 31.5 %
MCH: 33.5 PG (ref 27–33)
MCHC: 32.7 G/DL (ref 32–36)
MCV: 102.4 FL (ref 80–100)
MONOCYTES: 7.9 %
MPV: 9.3 FL (ref 7.5–12.5)
NEUTROPHILS: 55.1 %
NITRITE: NEGATIVE
NON-HDL CHOLESTEROL: 78 MG/DL (CALC)
OCCULT BLOOD: NEGATIVE
PH: 7 (ref 5–8)
PLATELET COUNT: 191 THOUSAND/UL (ref 140–400)
POTASSIUM: 5 MMOL/L (ref 3.5–5.3)
PROTEIN, TOTAL: 6.4 G/DL (ref 6.1–8.1)
PROTEIN: NEGATIVE
RDW: 12 % (ref 11–15)
RED BLOOD CELL COUNT: 4.93 MILLION/UL (ref 4.2–5.8)
SODIUM: 140 MMOL/L (ref 135–146)
SPECIFIC GRAVITY: 1.01 (ref 1–1.03)
T4, FREE: 1.4 NG/DL (ref 0.8–1.8)
TOTAL PSA: <0.1 NG/ML
TRIGLYCERIDES: 59 MG/DL
TSH W/REFLEX TO FT4: 4.9 MIU/L (ref 0.4–4.5)
WHITE BLOOD CELL COUNT: 5.4 THOUSAND/UL (ref 3.8–10.8)

## 2024-09-13 ENCOUNTER — OFFICE VISIT (OUTPATIENT)
Dept: INTERNAL MEDICINE CLINIC | Facility: CLINIC | Age: 69
End: 2024-09-13
Payer: COMMERCIAL

## 2024-09-13 VITALS
RESPIRATION RATE: 16 BRPM | HEART RATE: 78 BPM | DIASTOLIC BLOOD PRESSURE: 78 MMHG | SYSTOLIC BLOOD PRESSURE: 124 MMHG | OXYGEN SATURATION: 98 % | HEIGHT: 75 IN | BODY MASS INDEX: 25.12 KG/M2 | TEMPERATURE: 98 F | WEIGHT: 202 LBS

## 2024-09-13 DIAGNOSIS — Z00.00 ANNUAL PHYSICAL EXAM: Primary | ICD-10-CM

## 2024-09-13 NOTE — PROGRESS NOTES
Subjective:   Patient ID: Billy Basurto is a 69 year old male.    HPI  Billy Basurto is a 69 year old male who presents for a complete physical exam.   HPI:   Pt complains of nothing  Normal state of health    PAST MEDICAL, SOCIAL, FAMILY HISTORIES REVIEWED WITH PT  .    Immunization History   Administered Date(s) Administered    Covid-19 Vaccine Pfizer 30 mcg/0.3 ml 02/09/2021, 03/02/2021, 09/29/2021    Covid-19 Vaccine Pfizer Bivalent 30mcg/0.3mL 09/24/2022    Covid-19 Vaccine Pfizer Daniel-Sucrose 30 mcg/0.3 ml 04/19/2022    FLU VAC High Dose 65 YRS & Older PRSV Free (15681) 10/02/2019, 10/18/2021    FLUAD High Dose 65 yr and older (86867) 09/20/2020    FLUZONE 6 months and older PFS 0.5 ml (77713) 10/19/2017, 10/26/2018, 10/02/2019, 09/20/2020    Fluarix 6 Months And Older 0.5 ml prefilled syringe (59275) 10/26/2018    Fluzone Vaccine Medicare () 10/18/2021, 10/16/2022, 09/17/2023    Influenza 10/15/2014, 10/24/2016    Pfizer Covid-19 Vaccine 30mcg/0.3ml 12yrs+ 09/30/2023    Pneumococcal (Prevnar 13) 08/17/2020    Pneumovax 23 12/16/2016, 04/26/2019    RSV, recombinant, RSVpreF, adjuvanted (Arexvy) 12/27/2023    TDAP 03/24/2014    Zoster Vaccine Live (Zostavax) 03/24/2014    Zoster Vaccine Recombinant Adjuvanted (Shingrix) 08/13/2019, 01/10/2020   Deferred Date(s) Deferred    FLULAVAL 6 months & older 0.5 ml Prefilled syringe ([id number]) 09/25/2017     Wt Readings from Last 6 Encounters:   09/13/24 202 lb (91.6 kg)   09/12/23 206 lb (93.4 kg)   07/28/22 200 lb (90.7 kg)   01/26/22 203 lb 6.4 oz (92.3 kg)   12/08/21 190 lb (86.2 kg)   07/27/21 202 lb (91.6 kg)     Body mass index is 25.25 kg/m².     Lab Results   Component Value Date    GLU 89 09/04/2024    GLU 84 07/20/2023    GLU 89 07/21/2022     Lab Results   Component Value Date    CHOLEST 131 09/04/2024    CHOLEST 134 07/20/2023    CHOLEST 130 07/21/2022     Lab Results   Component Value Date    HDL 53 09/04/2024    HDL 49 07/20/2023     HDL 50 07/21/2022     Lab Results   Component Value Date    LDL 64 09/04/2024    LDL 71 07/20/2023    LDL 66 07/21/2022     Lab Results   Component Value Date    AST 21 09/04/2024    AST 25 07/20/2023    AST 22 07/21/2022     Lab Results   Component Value Date    ALT 18 09/04/2024    ALT 22 07/20/2023    ALT 15 07/21/2022     No results found for: \"PSA\"     Current Outpatient Medications   Medication Sig Dispense Refill    Flecainide Acetate 100 MG Oral Tab Take 1 tablet (100 mg total) by mouth 2 (two) times daily.      Atorvastatin Calcium 20 MG Oral Tab Take 1 tablet (20 mg total) by mouth nightly.      Multiple Vitamin (DAILY MARTÍNEZ) Oral Tab Take 1 tablet by mouth daily.        Past Medical History:    Allergic rhinitis    Some years worse than others    Anxiety    Atrial fibrillation (HCC)    Back pain    Cancer (HCC)    Prostrate (had surgery)    Cholelithiasis    Chronic hypotension    typically asymptomatic    Cubital tunnel syndrome    Diverticulosis    EIC (epidermal inclusion cyst)    L upper back s/p excision    Elevated prostate specific antigen (PSA)    Environmental allergies    pollen, dust, trees, grass    Foreign body granuloma of skin and subcutaneous tissue    Lt neck    H/O cardiovascular stress test    Internal hemorrhoids    Irregular bowel habits    Pain in joint    Other specified sites    Paresthesia    Prostate cancer (HCC)    Renal cyst    tiny Lt upper pole    Seasonal allergies    Sleep apnea    Splenic cyst    lower pole splenic cyst    Stress    Tendinopathy of left rotator cuff    Thoracic outlet syndrome    Toe fracture, right    fracture of lateral aspect of base of distal phalanges of Rt great toe    Unspecified local infection of skin and subcutaneous tissue    Wears glasses      Past Surgical History:   Procedure Laterality Date    Appendectomy  8/1991    Cardioversion  2011, 2015, 2016, 2017    Colonoscopy  5/7/2004    to cecum    Electrocardiogram, complete  12/6/2013     Laparoscopy, surgical prostatectomy, retropubic radical, w/nerve sparing      Ndsc ablation & rcnstj atria lmtd w/o bypass      Other surgical history  2011    robotic-assisted laparoscopic raidcal prostatectomy & B/L pelvic lymph node dissection    Other surgical history  3/6/2003    excision of EIC on Lt upper back    Other surgical history  2003    biopsy of skin lesion from Lt neck      Family History   Problem Relation Age of Onset    Hypertension Father         High blood pressure    Macular degeneration Father     Arthritis Father         gout    Heart Disorder Father         AAA    Other Father         Aortic Aneurysm    Arthritis Mother         Rheumatoid    Other (Other) Mother     Other Mother         Parkinson's disease & rheumatoid arthritis    Breast Cancer Sister 33    Other (Other) Sister 63        Pancreatic CA    Cancer Brother         Lung     Other (Other) Brother         Throat CA    Ovarian Cancer Sister 61    Arthritis Sister         Rheumatoid      Breast Cancer Maternal Aunt 45    Breast Cancer Other         maternal uncle's daughter - early 50's, unlateral mastectomy    Cancer Brother          of lung cancer in     Cancer Sister          of pancreatic cancer    Cancer Sister         Treated for ovarian cancer    Other Sister         Rheumatoid arthritis      Social History:  Social History     Socioeconomic History    Marital status:    Tobacco Use    Smoking status: Never    Smokeless tobacco: Never   Vaping Use    Vaping status: Never Used   Substance and Sexual Activity    Alcohol use: No     Alcohol/week: 0.0 standard drinks of alcohol    Drug use: No   Other Topics Concern    Seat Belt No    Special Diet No     Social Determinants of Health     Physical Activity: Sufficiently Active (2020)    [facility]    Exercise Vital Sign     Days of Exercise per Week: 7 days     Minutes of Exercise per Session: 30  min      . : yes. Children: yes.   Exercise:  twice per week, three times per week.  Diet: watches fats closely and watches sugar closely     REVIEW OF SYSTEMS:   A comprehensive 10 point review of systems was completed.     Pertinent positives and negatives noted in the HPI.      EXAM:   /78   Pulse 78   Temp 98 °F (36.7 °C)   Resp 16   Ht 6' 3\" (1.905 m)   Wt 202 lb (91.6 kg)   SpO2 98%   BMI 25.25 kg/m²   Body mass index is 25.25 kg/m².   GENERAL: well developed, well nourished,in no apparent distress  SKIN: no rashes,no suspicious lesions  HEENT: atraumatic, normocephalic,ears and throat are clear  EYES:PERRLA, EOMI,,conjunctiva are clear  NECK: supple,no adenopathy,no bruits  LUNGS: clear to auscultation  CARDIO: RRR without murmur  GI: good BS's,no masses, HSM or tenderness  :deferred  MUSCULOSKELETAL: back is not tender  EXTREMITIES: no cyanosis, clubbing or edema  NEURO: Oriented times three,,motor and sensory are grossly intact    ASSESSMENT AND PLAN:   Billy Basurto is a 69 year old male who presents for a complete physical exam.  Body mass index is 25.25 kg/m²., recommended low fat diet and aerobic exercise 30 minutes three times weekly. Health maintenance, will check fasting Lipids, CMP, CBC and PSA.   Pt referred for screening colonoscopy.   Pt info handouts given for: exercise, low fat diet,    The patient indicates understanding of these issues and agrees to the plan.  The patient is asked to return for CPX in 12 m.    History/Other:   Review of Systems  Current Outpatient Medications   Medication Sig Dispense Refill    Flecainide Acetate 100 MG Oral Tab Take 1 tablet (100 mg total) by mouth 2 (two) times daily.      Atorvastatin Calcium 20 MG Oral Tab Take 1 tablet (20 mg total) by mouth nightly.      Multiple Vitamin (DAILY MARTÍNEZ) Oral Tab Take 1 tablet by mouth daily.       Allergies:No Known Allergies    Objective:   Physical Exam    Assessment & Plan:   1. Annual  physical exam        No orders of the defined types were placed in this encounter.      Meds This Visit:  Requested Prescriptions      No prescriptions requested or ordered in this encounter       Imaging & Referrals:  None

## 2024-10-16 ENCOUNTER — OFFICE VISIT (OUTPATIENT)
Dept: INTERNAL MEDICINE CLINIC | Facility: CLINIC | Age: 69
End: 2024-10-16
Payer: COMMERCIAL

## 2024-10-16 VITALS
RESPIRATION RATE: 16 BRPM | OXYGEN SATURATION: 98 % | HEART RATE: 68 BPM | WEIGHT: 205 LBS | DIASTOLIC BLOOD PRESSURE: 70 MMHG | HEIGHT: 75 IN | BODY MASS INDEX: 25.49 KG/M2 | SYSTOLIC BLOOD PRESSURE: 120 MMHG

## 2024-10-16 DIAGNOSIS — G57.63 MORTON'S METATARSALGIA, NEURALGIA, OR NEUROMA, BILATERAL: Primary | ICD-10-CM

## 2024-10-16 DIAGNOSIS — Z23 NEED FOR INFLUENZA VACCINATION: ICD-10-CM

## 2024-10-16 PROCEDURE — 90662 IIV NO PRSV INCREASED AG IM: CPT | Performed by: PHYSICIAN ASSISTANT

## 2024-10-16 PROCEDURE — 3008F BODY MASS INDEX DOCD: CPT | Performed by: PHYSICIAN ASSISTANT

## 2024-10-16 PROCEDURE — 99213 OFFICE O/P EST LOW 20 MIN: CPT | Performed by: PHYSICIAN ASSISTANT

## 2024-10-16 PROCEDURE — 3074F SYST BP LT 130 MM HG: CPT | Performed by: PHYSICIAN ASSISTANT

## 2024-10-16 PROCEDURE — 90471 IMMUNIZATION ADMIN: CPT | Performed by: PHYSICIAN ASSISTANT

## 2024-10-16 PROCEDURE — 3078F DIAST BP <80 MM HG: CPT | Performed by: PHYSICIAN ASSISTANT

## 2024-10-16 NOTE — PROGRESS NOTES
Billy Basurto is a 69 year old male.  HPI:   Pt here for ongoing numbness in feet that has become more bothersome    In 2021 saw Dr Marie podiatry for numbness in feet, dx Aceves's neuroma and metatarsalgia. Initially improved with orthotics, but now progressively more bothersome x 3 mos. Wearing his usual shoes. Now it's limiting his walking.   Numbness, discomfort is R>L, gradually worsening  Denies pain  Feels he's walking differently which is triggering some right hip pain      Current Outpatient Medications   Medication Sig Dispense Refill    Flecainide Acetate 100 MG Oral Tab Take 1 tablet (100 mg total) by mouth 2 (two) times daily.      Atorvastatin Calcium 20 MG Oral Tab Take 1 tablet (20 mg total) by mouth nightly.      Multiple Vitamin (DAILY MARTÍNEZ) Oral Tab Take 1 tablet by mouth daily.        Past Medical History:    Allergic rhinitis    Some years worse than others    Anxiety    Atrial fibrillation (HCC)    Back pain    Cancer (HCC)    Prostrate (had surgery)    Cholelithiasis    Chronic hypotension    typically asymptomatic    Cubital tunnel syndrome    Diverticulosis    EIC (epidermal inclusion cyst)    L upper back s/p excision    Elevated prostate specific antigen (PSA)    Environmental allergies    pollen, dust, trees, grass    Foreign body granuloma of skin and subcutaneous tissue    Lt neck    H/O cardiovascular stress test    Internal hemorrhoids    Irregular bowel habits    Pain in joint    Other specified sites    Paresthesia    Prostate cancer (HCC)    Renal cyst    tiny Lt upper pole    Seasonal allergies    Sleep apnea    Splenic cyst    lower pole splenic cyst    Stress    Tendinopathy of left rotator cuff    Thoracic outlet syndrome    Toe fracture, right    fracture of lateral aspect of base of distal phalanges of Rt great toe    Unspecified local infection of skin and subcutaneous tissue    Wears glasses      Social History:  Social History     Socioeconomic History     Marital status:    Tobacco Use    Smoking status: Never    Smokeless tobacco: Never   Vaping Use    Vaping status: Never Used   Substance and Sexual Activity    Alcohol use: No     Alcohol/week: 0.0 standard drinks of alcohol    Drug use: No   Other Topics Concern    Seat Belt No    Special Diet No     Social Drivers of Health     Physical Activity: Sufficiently Active (12/2/2020)    Received from [facility]    Exercise Vital Sign     Days of Exercise per Week: 7 days     Minutes of Exercise per Session: 30 min        REVIEW OF SYSTEMS:   GENERAL HEALTH: feels well otherwise. Denies fever, chills, unintentional weight change  SKIN: denies any unusual skin lesions or rashes  RESPIRATORY: denies shortness of breath with exertion, denies cough or wheezing  CARDIOVASCULAR: denies chest pain or palpitations, denies leg swelling  GI: denies abdominal pain and denies heartburn. Denies nausea, vomiting, diarrhea, constipation  NEURO: denies headaches, dizziness, weakness, syncope    EXAM:   /70   Pulse 68   Resp 16   Ht 6' 3\" (1.905 m)   Wt 205 lb (93 kg)   SpO2 98%   BMI 25.62 kg/m²   GENERAL: well developed, well nourished,in no apparent distress  SKIN: no rashes,no suspicious lesions, warm and dry  EXTREMITIES: no cyanosis, clubbing or edema  MUSCULOSKELETAL: FROM, no joint swelling or bony tenderness  No tenderness or focal swelling bl feet.   NEURO: a/ox3, no focal deficits  PSYCH: mood and affect normal    ASSESSMENT AND PLAN:   1. Aceves's metatarsalgia, neuralgia, or neuroma, bilateral (Primary)  -     Podiatry Referral  Bothersome despite conservative tx. Reestablish with podiatry.   2. Need for influenza vaccination  -     High Dose Fluzone trivalent influenza, 65 yrs+ PFS [06561]        The patient indicates understanding of these issues and agrees to the plan.  Return if symptoms worsen or fail to improve.

## 2024-12-18 ENCOUNTER — OFFICE VISIT (OUTPATIENT)
Dept: PODIATRY CLINIC | Facility: CLINIC | Age: 69
End: 2024-12-18
Payer: COMMERCIAL

## 2024-12-18 DIAGNOSIS — M79.671 RIGHT FOOT PAIN: ICD-10-CM

## 2024-12-18 DIAGNOSIS — M77.41 METATARSALGIA, RIGHT FOOT: ICD-10-CM

## 2024-12-18 DIAGNOSIS — G57.61 MORTON NEUROMA, RIGHT: Primary | ICD-10-CM

## 2024-12-18 DIAGNOSIS — G57.62 MORTON NEUROMA, LEFT: ICD-10-CM

## 2024-12-18 DIAGNOSIS — M77.42 METATARSALGIA, LEFT FOOT: ICD-10-CM

## 2024-12-19 VITALS — SYSTOLIC BLOOD PRESSURE: 112 MMHG | DIASTOLIC BLOOD PRESSURE: 60 MMHG

## 2024-12-19 RX ORDER — TRIAMCINOLONE ACETONIDE 40 MG/ML
20 INJECTION, SUSPENSION INTRA-ARTICULAR; INTRAMUSCULAR ONCE
Status: COMPLETED | OUTPATIENT
Start: 2024-12-19 | End: 2024-12-18

## 2024-12-19 NOTE — PROGRESS NOTES
Per Dr. Diallo draw up 0.5ml of 0.5% Marcaine and 0.5ml of Kenalog 40 for injection to right foot.

## 2024-12-22 NOTE — PROGRESS NOTES
[facility] Podiatry  Progress Note    Billy Basurto is a 69 year old male.   Chief Complaint   Patient presents with    Foot Pain     Bilateral feet- numbness- discomfort at night and while walking-          HPI:     Patient is a very pleasant 69-year-old male presents to clinic for evaluation of bilateral feet numbness and discomfort.  He experiences this with walking and sometimes at night.  He does have history of back pain.  He also has history of neuroma type symptoms in his foot.  These were managed with custom inserts in the past but they are worsening over recent years.  He denies any recent trauma or injury.  Patient is active and likes to walk a lot for exercise.  He does ambulate with supportive new balance shoes and inserts.  No other complaints are mentioned.  Past medical history, medications, allergies reviewed.      Allergies: Patient has no known allergies.   Current Outpatient Medications   Medication Sig Dispense Refill    Flecainide Acetate 100 MG Oral Tab Take 1 tablet (100 mg total) by mouth 2 (two) times daily.      Atorvastatin Calcium 20 MG Oral Tab Take 1 tablet (20 mg total) by mouth nightly.      Multiple Vitamin (DAILY MARTÍNEZ) Oral Tab Take 1 tablet by mouth daily.        Past Medical History:    Allergic rhinitis    Some years worse than others    Anxiety    Atrial fibrillation (HCC)    Back pain    Cancer (HCC)    Prostrate (had surgery)    Cholelithiasis    Chronic hypotension    typically asymptomatic    Cubital tunnel syndrome    Diverticulosis    EIC (epidermal inclusion cyst)    L upper back s/p excision    Elevated prostate specific antigen (PSA)    Environmental allergies    pollen, dust, trees, grass    Foreign body granuloma of skin and subcutaneous tissue    Lt neck    H/O cardiovascular stress test    Internal hemorrhoids    Irregular bowel habits    Pain in joint    Other specified sites    Paresthesia    Prostate cancer (HCC)    Renal cyst    tiny Lt upper  pole    Seasonal allergies    Sleep apnea    Splenic cyst    lower pole splenic cyst    Stress    Tendinopathy of left rotator cuff    Thoracic outlet syndrome    Toe fracture, right    fracture of lateral aspect of base of distal phalanges of Rt great toe    Unspecified local infection of skin and subcutaneous tissue    Wears glasses      Past Surgical History:   Procedure Laterality Date    Appendectomy  1991    Cardioversion  , , , 2017    Colonoscopy  2004    to cecum    Electrocardiogram, complete  2013    Laparoscopy, surgical prostatectomy, retropubic radical, w/nerve sparing      Ndsc ablation & rcnstj atria lmtd w/o bypass      Other surgical history  2011    robotic-assisted laparoscopic raidcal prostatectomy & B/L pelvic lymph node dissection    Other surgical history  3/6/2003    excision of EIC on Lt upper back    Other surgical history  2003    biopsy of skin lesion from Lt neck      Family History   Problem Relation Age of Onset    Hypertension Father         High blood pressure    Macular degeneration Father     Arthritis Father         gout    Heart Disorder Father         AAA    Other Father         Aortic Aneurysm    Arthritis Mother         Rheumatoid    Other (Other) Mother     Other Mother         Parkinson's disease & rheumatoid arthritis    Breast Cancer Sister 33    Other (Other) Sister 63        Pancreatic CA    Cancer Brother         Lung     Other (Other) Brother         Throat CA    Ovarian Cancer Sister 61    Arthritis Sister         Rheumatoid      Breast Cancer Maternal Aunt 45    Breast Cancer Other         maternal uncle's daughter - early 50's, unlateral mastectomy    Cancer Brother          of lung cancer in     Cancer Sister          of pancreatic cancer    Cancer Sister         Treated for ovarian cancer    Other Sister         Rheumatoid arthritis      Social History     Socioeconomic History    Marital status:    Tobacco Use     Smoking status: Never    Smokeless tobacco: Never   Vaping Use    Vaping status: Never Used   Substance and Sexual Activity    Alcohol use: No     Alcohol/week: 0.0 standard drinks of alcohol    Drug use: No   Other Topics Concern    Seat Belt No    Special Diet No           REVIEW OF SYSTEMS:     No n/v/f/c. History of back pain.      EXAM:   /60 (BP Location: Right arm, Patient Position: Sitting, Cuff Size: adult)   GENERAL: well developed, well nourished, in no apparent distress  EXTREMITIES:   1. Integument: Normal skin temperature and turgor  2. Vascular: Dorsalis pedis two out of four bilateral and posterior tibial pulses two out of   four bilateral, capillary refill normal.   3. Musculoskeletal: All muscle groups are graded 5 out of 5 in the foot and ankle.  Prominent metatarsal heads.  Pain noted to third interspace of both feet with palpable click with the right worse than the left.  No pain noted to surrounding metatarsal shafts.   4. Neurological: Normal sharp dull sensation; reflexes normal.          ASSESSMENT AND PLAN:   Diagnoses and all orders for this visit:    Aceves neuroma, right  -     triamcinolone acetonide (Kenalog-40) 40 MG/ML injection 20 mg    Right foot pain    Metatarsalgia, right foot    Aceves neuroma, left    Metatarsalgia, left foot        Plan:    -Patient examined, chart history reviewed.  -Discussed etiology of condition and various treatment options.  -Patient's condition consistent with neuroma right (and possibly left) third interspace.  -Discussed steroid injection with patient including benefits and risks.  -Patient agreeable with injection and written consent was obtained.  -After prepping site with alcohol, administered steroid injection to the right third interspace consisting of 0.5 cc of 0.5% Marcaine plain, 0.5 cc of Kenalog 40.   -Patient tolerated injection well and there were no complications.  Site was dressed with Band-Aid.  -Patient can ice or take  ibuprofen if pain arises to site after injection  -Patient should ambulate with supportive shoes with wide toe box and custom inserts.  -If symptoms worsen or fail to improve could consider MRI and/or EMG.  -We will plan to follow-up with patient in 1 month if symptoms persist or sooner further concerns arise     The patient indicates understanding of these issues and agrees to the plan.        Stephan Diallo DPM    Dragon speech recognition software was used to prepare this note.  Errors in word recognition may occur.  Please contact me with any questions/concerns with this note.

## 2025-01-13 ENCOUNTER — OFFICE VISIT (OUTPATIENT)
Dept: PODIATRY CLINIC | Facility: CLINIC | Age: 70
End: 2025-01-13
Payer: COMMERCIAL

## 2025-01-13 VITALS — SYSTOLIC BLOOD PRESSURE: 106 MMHG | DIASTOLIC BLOOD PRESSURE: 62 MMHG

## 2025-01-13 DIAGNOSIS — G57.62 MORTON NEUROMA, LEFT: ICD-10-CM

## 2025-01-13 DIAGNOSIS — M54.16 LUMBAR RADICULOPATHY: ICD-10-CM

## 2025-01-13 DIAGNOSIS — G57.61 MORTON NEUROMA, RIGHT: Primary | ICD-10-CM

## 2025-01-13 DIAGNOSIS — M79.2 NEURITIS: ICD-10-CM

## 2025-01-13 PROCEDURE — 3078F DIAST BP <80 MM HG: CPT | Performed by: STUDENT IN AN ORGANIZED HEALTH CARE EDUCATION/TRAINING PROGRAM

## 2025-01-13 PROCEDURE — 99213 OFFICE O/P EST LOW 20 MIN: CPT | Performed by: STUDENT IN AN ORGANIZED HEALTH CARE EDUCATION/TRAINING PROGRAM

## 2025-01-13 PROCEDURE — 3074F SYST BP LT 130 MM HG: CPT | Performed by: STUDENT IN AN ORGANIZED HEALTH CARE EDUCATION/TRAINING PROGRAM

## 2025-01-13 RX ORDER — TRIAMCINOLONE ACETONIDE 40 MG/ML
20 INJECTION, SUSPENSION INTRA-ARTICULAR; INTRAMUSCULAR ONCE
Status: COMPLETED | OUTPATIENT
Start: 2025-01-13 | End: 2025-01-13

## 2025-01-13 NOTE — PROCEDURES
Per Dr. Diallo to draw up .5ml of dexamethasone sodium phosphate. .5ml Kenalog. 10 and 1ml marcaine 0.5% for a right foot injection.

## 2025-01-13 NOTE — PROGRESS NOTES
[facility] Podiatry  Progress Note    Billy Basurto is a 70 year old male.   Chief Complaint   Patient presents with    Follow - Up     Numbness bilateral feet, feels his toes again, ball of foot completely numb, denies pain          HPI:     Patient is a very pleasant 70-year-old male presents to clinic for evaluation of bilateral feet numbness and discomfort.  He experiences this with walking and sometimes at night.  He does have history of back pain.  He also has history of neuroma type symptoms in his foot.  He did have cortisone injection in her right foot at last visit which somewhat helped his symptoms but did not fully resolve them.  He is wondering what next steps are for treatment options.   No other complaints are mentioned.  Past medical history, medications, allergies reviewed.      Allergies: Patient has no known allergies.   Current Outpatient Medications   Medication Sig Dispense Refill    Flecainide Acetate 100 MG Oral Tab Take 1 tablet (100 mg total) by mouth 2 (two) times daily.      Atorvastatin Calcium 20 MG Oral Tab Take 1 tablet (20 mg total) by mouth nightly.      Multiple Vitamin (DAILY MARTÍNEZ) Oral Tab Take 1 tablet by mouth daily.        Past Medical History:    Allergic rhinitis    Some years worse than others    Anxiety    Atrial fibrillation (HCC)    Back pain    Cancer (HCC)    Prostrate (had surgery)    Cholelithiasis    Chronic hypotension    typically asymptomatic    Cubital tunnel syndrome    Diverticulosis    EIC (epidermal inclusion cyst)    L upper back s/p excision    Elevated prostate specific antigen (PSA)    Environmental allergies    pollen, dust, trees, grass    Foreign body granuloma of skin and subcutaneous tissue    Lt neck    H/O cardiovascular stress test    Internal hemorrhoids    Irregular bowel habits    Pain in joint    Other specified sites    Paresthesia    Prostate cancer (HCC)    Renal cyst    tiny Lt upper pole    Seasonal allergies    Sleep  apnea    Splenic cyst    lower pole splenic cyst    Stress    Tendinopathy of left rotator cuff    Thoracic outlet syndrome    Toe fracture, right    fracture of lateral aspect of base of distal phalanges of Rt great toe    Unspecified local infection of skin and subcutaneous tissue    Wears glasses      Past Surgical History:   Procedure Laterality Date    Appendectomy  1991    Cardioversion  , , , 2017    Colonoscopy  2004    to cecum    Electrocardiogram, complete  2013    Laparoscopy, surgical prostatectomy, retropubic radical, w/nerve sparing      Ndsc ablation & rcnstj atria lmtd w/o bypass      Other surgical history  2011    robotic-assisted laparoscopic raidcal prostatectomy & B/L pelvic lymph node dissection    Other surgical history  3/6/2003    excision of EIC on Lt upper back    Other surgical history  2003    biopsy of skin lesion from Lt neck      Family History   Problem Relation Age of Onset    Hypertension Father         High blood pressure    Macular degeneration Father     Arthritis Father         gout    Heart Disorder Father         AAA    Other Father         Aortic Aneurysm    Arthritis Mother         Rheumatoid    Other (Other) Mother     Other Mother         Parkinson's disease & rheumatoid arthritis    Breast Cancer Sister 33    Other (Other) Sister 63        Pancreatic CA    Cancer Brother         Lung     Other (Other) Brother         Throat CA    Ovarian Cancer Sister 61    Arthritis Sister         Rheumatoid      Breast Cancer Maternal Aunt 45    Breast Cancer Other         maternal uncle's daughter - early 50's, unlateral mastectomy    Cancer Brother          of lung cancer in     Cancer Sister          of pancreatic cancer    Cancer Sister         Treated for ovarian cancer    Other Sister         Rheumatoid arthritis      Social History     Socioeconomic History    Marital status:    Tobacco Use    Smoking status: Never    Smokeless  tobacco: Never   Vaping Use    Vaping status: Never Used   Substance and Sexual Activity    Alcohol use: No     Alcohol/week: 0.0 standard drinks of alcohol    Drug use: No   Other Topics Concern    Seat Belt No    Special Diet No           REVIEW OF SYSTEMS:     No n/v/f/c. History of back pain.      EXAM:   /62 (BP Location: Left arm, Patient Position: Sitting, Cuff Size: adult)   GENERAL: well developed, well nourished, in no apparent distress  EXTREMITIES:   1. Integument: Normal skin temperature and turgor  2. Vascular: Dorsalis pedis two out of four bilateral and posterior tibial pulses two out of   four bilateral, capillary refill normal.   3. Musculoskeletal: All muscle groups are graded 5 out of 5 in the foot and ankle.  Prominent metatarsal heads.  Pain noted to third interspace of both feet with palpable click with the right worse than the left.  No pain noted to surrounding metatarsal shafts.   4. Neurological: Normal sharp dull sensation; reflexes normal.          ASSESSMENT AND PLAN:   Diagnoses and all orders for this visit:    Aceves neuroma, right  -     EMG (At [facility]); Future  -     triamcinolone acetonide (Kenalog-40) 40 MG/ML injection 20 mg    Aceves neuroma, left  -     EMG (At [facility]); Future    Lumbar radiculopathy  -     EMG (At [facility]); Future    Neuritis  -     EMG (At [facility]); Future          Plan:    -Patient examined, chart history reviewed.  -Discussed etiology of condition and various treatment options.  -Patient's condition consistent with neuroma right (and possibly left) third interspace.  -Patient's symptoms improved with last cortisone injection but are not fully resolved.  -Discussed repeat steroid injection with patient including benefits and risks.  -Patient agreeable with injection and written consent was obtained.  -After prepping site with alcohol, administered steroid injection to the right third interspace consisting of 0.5 cc of 0.5%  Marcaine plain, 0.5 cc of Kenalog 40.   -Patient tolerated injection well and there were no complications.  Site was dressed with Band-Aid.  -Patient can ice or take ibuprofen if pain arises to site after injection  -Patient should ambulate with supportive shoes with wide toe box and custom inserts.  -Will also order EMG as patient does have some history of lower back pain and similar symptoms to both feet.  There may be a right lumbar radiculopathy component of his symptoms.  -We will plan to follow-up with patient in 1 month if symptoms persist or sooner further concerns arise     The patient indicates understanding of these issues and agrees to the plan.        Stephan Diallo DPM    Dragon speech recognition software was used to prepare this note.  Errors in word recognition may occur.  Please contact me with any questions/concerns with this note.

## 2025-05-19 ENCOUNTER — OFFICE VISIT (OUTPATIENT)
Dept: ELECTROPHYSIOLOGY | Facility: HOSPITAL | Age: 70
End: 2025-05-19
Attending: STUDENT IN AN ORGANIZED HEALTH CARE EDUCATION/TRAINING PROGRAM
Payer: COMMERCIAL

## 2025-05-19 DIAGNOSIS — M54.16 LUMBAR RADICULOPATHY: ICD-10-CM

## 2025-05-19 DIAGNOSIS — M79.2 NEURITIS: ICD-10-CM

## 2025-05-19 DIAGNOSIS — G57.61 MORTON NEUROMA, RIGHT: ICD-10-CM

## 2025-05-19 DIAGNOSIS — G57.62 MORTON NEUROMA, LEFT: ICD-10-CM

## 2025-05-19 PROCEDURE — 95909 NRV CNDJ TST 5-6 STUDIES: CPT | Performed by: OTHER

## 2025-05-19 PROCEDURE — 95886 MUSC TEST DONE W/N TEST COMP: CPT | Performed by: OTHER

## 2025-05-19 NOTE — PROCEDURES
[facility]  [address] [place] [id number]  [phone]            Patient: Billy Basurto Gender: Male  Patient ID: [id number] YOB: 1955      Visit Date: 5/19/2025 8:56 AM  Patient Age On Visit Date: 70 Years      Sensory NCS      Nerve / Sites Rec. Site Onset Lat Peak Lat Ref. NP Amp Ref. PP Amp Ref. SPAR Ref. Segments Distance Onset Paul     ms ms ms µV µV µV µV % %  cm m/s   R Sural      Calf Lat Mall 2.75 3.44 <=4.40 5.6 >=5.0 7.8 >=5.0 79.3 >=40.00 Calf - Lat Mall 14 50.9   L Sural      Calf Lat Mall 2.13 2.69 <=4.40 7.0 >=5.0 9.6 >=5.0 100 >=40.00 Calf - Lat Mall 14 65.9           Motor NCS      Nerve / Sites Rec. Site Lat Ref. Amp Ref. Seq Amp Ref. SPAR Ref. Segments Dist. Paul Ref.     ms ms mV mV % % % %  cm m/s m/s   R Peroneal - EDB      Ankle EDB 5.77 <=6.20 3.7 >=2.0 100  72.6 >=50.00 Ankle - EDB 8 14       Fib Head EDB 12.96  4.4  119 <=115 100  Fib Head - Ankle 29 40 >=39   L Peroneal - EDB      Ankle EDB 5.23 <=6.20 5.1 >=2.0 100  100 >=50.00 Ankle - EDB 8 15       Fib Head EDB 11.94  4.3  83.6 >=70 96.8  Fib Head - Ankle 29 43 >=39   L Tibial - AH      Ankle AH 5.65 <=6.00 6.3 >=3.0 100  80.5 >=50.00 Ankle - AH 8 14       Knee AH 14.60     >=50   Knee - Ankle 40 45 >=39   R Tibial - AH      Ankle AH 5.17 <=6.00 7.8 >=3.0 100  100 >=50.00 Ankle - AH 8 15       Knee AH 13.42  7.5  96.9 <=115   Knee - Ankle 40 48 >=39               EMG Summary Table     Spontaneous MUAP Recruitment   Muscle Nerve Roots IA Fib PSW Fasc H.F. Amp Dur. PPP Pattern   L. Tibialis anterior Deep peroneal (Fibular) L4-L5 N None None None None N N N N   L. Gastrocnemius (Medial head) Tibial S1-S2 N None None None None N N N N   L. Lumbar paraspinals (mid)  - N None None None None       L. Lumbar paraspinals (low)  - N None None None None       R. Gastrocnemius (Medial head) Tibial S1-S2 N None None None None N N N N   R. Tibialis anterior Deep peroneal (Fibular) L4-L5 N  None None None None N N N N   R. Lumbar paraspinals (mid)  - 1+ None 1+ None None       R. Lumbar paraspinals (low)  - N None None None None       R. Vastus medialis Femoral L2-L4 N None None None None N N N N   L. Vastus medialis Femoral L2-L4 N None None None None N N N N         Summary:  Bilateral sural SNAPs were normal.     Bilateral peroneal and bilateral tibial motor responses were normal.     The needle study of selected muscles of the BLE (L3-S1 myotomes), and the bilateral L4-L5 and bilateral L5-S1 paraspinal muscles was normal.      Conclusion:   This is a normal study. There is no electrodiagnostic evidence of a LE large fibre polyneuropathy or a lumbar radiculopathy at this time.          Khanh Johnson D.O.  Neurology

## 2025-06-02 ENCOUNTER — OFFICE VISIT (OUTPATIENT)
Dept: INTERNAL MEDICINE CLINIC | Facility: CLINIC | Age: 70
End: 2025-06-02
Payer: COMMERCIAL

## 2025-06-02 VITALS
DIASTOLIC BLOOD PRESSURE: 70 MMHG | HEART RATE: 70 BPM | TEMPERATURE: 98 F | RESPIRATION RATE: 16 BRPM | WEIGHT: 212 LBS | SYSTOLIC BLOOD PRESSURE: 118 MMHG | HEIGHT: 75 IN | OXYGEN SATURATION: 98 % | BODY MASS INDEX: 26.36 KG/M2

## 2025-06-02 DIAGNOSIS — B35.1 ONYCHOMYCOSIS: Primary | ICD-10-CM

## 2025-06-02 PROCEDURE — G2211 COMPLEX E/M VISIT ADD ON: HCPCS | Performed by: PHYSICIAN ASSISTANT

## 2025-06-02 PROCEDURE — 3074F SYST BP LT 130 MM HG: CPT | Performed by: PHYSICIAN ASSISTANT

## 2025-06-02 PROCEDURE — 3078F DIAST BP <80 MM HG: CPT | Performed by: PHYSICIAN ASSISTANT

## 2025-06-02 PROCEDURE — 99213 OFFICE O/P EST LOW 20 MIN: CPT | Performed by: PHYSICIAN ASSISTANT

## 2025-06-02 PROCEDURE — 3008F BODY MASS INDEX DOCD: CPT | Performed by: PHYSICIAN ASSISTANT

## 2025-06-02 RX ORDER — CICLOPIROX 80 MG/ML
SOLUTION TOPICAL
Qty: 6.6 ML | Refills: 1 | Status: SHIPPED | OUTPATIENT
Start: 2025-06-02

## 2025-06-02 NOTE — PROGRESS NOTES
Billy Basurto is a 70 year old male.  HPI:     History of Present Illness  Billy Basurto is a 70 year old male who presents with changes in the nail of his right big toe.    A few weeks ago, the nail on his right big toe stopped growing, while other nails continued to grow.  The nail is yellower with a ridge and occasional redness compared to the left big toe. He has been using tolnaftate, an over-the-counter antifungal medication, since last Friday.    He experiences numbness in both feet, with the right foot being affected for a longer duration. He has been receiving cortisone shots for a suspected neuroma, which provided limited relief. An EMG test conducted two to three weeks ago showed no abnormality     He walks about 1.25 to 1.5 miles three to four times a week but has reduced his walking due to foot discomfort, which he believes has contributed to weight gain. No tenderness and flakiness of the skin around the affected nail. Occasional brown discoloration on the side of the nail but no soreness.     Current Medications[1]   Past Medical History[2]   Social History:  Short Social Hx on File[3]     REVIEW OF SYSTEMS:   GENERAL HEALTH: feels well otherwise. Denies fever, chills, unintentional weight change  SKIN: denies any unusual skin lesions or rashes  RESPIRATORY: denies shortness of breath with exertion, denies cough or wheezing  CARDIOVASCULAR: denies chest pain or palpitations, denies leg swelling  GI: denies abdominal pain and denies heartburn. Denies nausea, vomiting, diarrhea, constipation  NEURO: denies headaches, dizziness, weakness, syncope    EXAM:   /70   Pulse 70   Temp 97.8 °F (36.6 °C)   Resp 16   Ht 6' 3\" (1.905 m)   Wt 212 lb (96.2 kg)   SpO2 98%   BMI 26.50 kg/m²   GENERAL: well developed, well nourished,in no apparent distress  SKIN: no rashes,no suspicious lesions, warm and dry  R foot great toenail yellowed with horizontal ridge at distal 1/3 of  nail  HEENT: atraumatic, normocephalic  LUNGS: clear to auscultation b/l no W/R/R  CARDIO: RRR without murmur  EXTREMITIES: no cyanosis, clubbing or edema  MUSCULOSKELETAL: FROM, no joint swelling or bony tenderness  NEURO: a/ox3, no focal deficits  PSYCH: mood and affect normal    ASSESSMENT AND PLAN:   1. Onychomycosis (Primary)  -     Ciclopirox; Apply to nails nightly. Once a week remove solution with rubbing alcohol.  Dispense: 6.6 mL; Refill: 1   Start penlac as above. Discussed medication indications, risks, alternatives and side effects. Discussed expected slow improvement as new nail grows in. Monitor. Continue regular f/up with podiatry.     The patient indicates understanding of these issues and agrees to the plan.  Return if symptoms worsen or fail to improve.        [1]   Current Outpatient Medications   Medication Sig Dispense Refill    Ciclopirox 8 % External Solution Apply to nails nightly. Once a week remove solution with rubbing alcohol. 6.6 mL 1    Flecainide Acetate 100 MG Oral Tab Take 1 tablet (100 mg total) by mouth 2 (two) times daily.      Atorvastatin Calcium 20 MG Oral Tab Take 1 tablet (20 mg total) by mouth nightly.      Multiple Vitamin (DAILY MARTÍNEZ) Oral Tab Take 1 tablet by mouth daily.     [2]   Past Medical History:   Allergic rhinitis    Some years worse than others    Anxiety    Atrial fibrillation (HCC)    Back pain    Cancer (HCC)    Prostrate (had surgery)    Cholelithiasis    Chronic hypotension    typically asymptomatic    Cubital tunnel syndrome    Diverticulosis    EIC (epidermal inclusion cyst)    L upper back s/p excision    Elevated prostate specific antigen (PSA)    Environmental allergies    pollen, dust, trees, grass    Foreign body granuloma of skin and subcutaneous tissue    Lt neck    H/O cardiovascular stress test    Internal hemorrhoids    Irregular bowel habits    Pain in joint    Other specified sites    Paresthesia    Prostate cancer (HCC)    Renal cyst    tiny  Lt upper pole    Seasonal allergies    Sleep apnea    Splenic cyst    lower pole splenic cyst    Stress    Tendinopathy of left rotator cuff    Thoracic outlet syndrome    Toe fracture, right    fracture of lateral aspect of base of distal phalanges of Rt great toe    Unspecified local infection of skin and subcutaneous tissue    Wears glasses   [3]   Social History  Socioeconomic History    Marital status:    Tobacco Use    Smoking status: Never    Smokeless tobacco: Never   Vaping Use    Vaping status: Never Used   Substance and Sexual Activity    Alcohol use: No     Alcohol/week: 0.0 standard drinks of alcohol    Drug use: No   Other Topics Concern    Seat Belt No    Special Diet No

## 2025-06-02 NOTE — PROGRESS NOTES
The following individual(s) verbally consented to be recorded using ambient AI listening technology and understand that they can each withdraw their consent to this listening technology at any point by asking the clinician to turn off or pause the recording:    Patient name: Billy Basurto  Additional names:  none

## 2025-07-02 ENCOUNTER — OFFICE VISIT (OUTPATIENT)
Dept: PODIATRY CLINIC | Facility: CLINIC | Age: 70
End: 2025-07-02
Payer: COMMERCIAL

## 2025-07-02 DIAGNOSIS — M79.2 NEURITIS: ICD-10-CM

## 2025-07-02 DIAGNOSIS — M79.671 RIGHT FOOT PAIN: ICD-10-CM

## 2025-07-02 DIAGNOSIS — G57.61 MORTON NEUROMA, RIGHT: Primary | ICD-10-CM

## 2025-07-02 DIAGNOSIS — B35.1 ONYCHOMYCOSIS: ICD-10-CM

## 2025-07-02 PROCEDURE — 99214 OFFICE O/P EST MOD 30 MIN: CPT | Performed by: STUDENT IN AN ORGANIZED HEALTH CARE EDUCATION/TRAINING PROGRAM

## 2025-07-02 NOTE — PROGRESS NOTES
[facility] Podiatry  Progress Note    Billy Basurto is a 70 year old male.   Chief Complaint   Patient presents with    Follow - Up     Right foot neuroma- patient is here for nerves test results- toenail fungus on the right big toenail.          HPI:     Patient is a very pleasant 70-year-old male presents to clinic for evaluation of bilateral feet numbness and discomfort.  He has had several cortisone injections to his right foot which have not really helped his symptoms.  He did complete EMG which was without abnormalities.  Patient is also concerned about possible fungus to his right great toe.  He presents today for evaluation.  No other complaints are mentioned.  Past medical history, medications, allergies reviewed.      Allergies: Patient has no known allergies.   Current Outpatient Medications   Medication Sig Dispense Refill    Ciclopirox 8 % External Solution Apply to nails nightly. Once a week remove solution with rubbing alcohol. 6.6 mL 1    Flecainide Acetate 100 MG Oral Tab Take 1 tablet (100 mg total) by mouth 2 (two) times daily.      Atorvastatin Calcium 20 MG Oral Tab Take 1 tablet (20 mg total) by mouth nightly.      Multiple Vitamin (DAILY MARTÍNEZ) Oral Tab Take 1 tablet by mouth daily.        Past Medical History:    Allergic rhinitis    Some years worse than others    Anxiety    Atrial fibrillation (HCC)    Back pain    Cancer (HCC)    Prostrate (had surgery)    Cholelithiasis    Chronic hypotension    typically asymptomatic    Cubital tunnel syndrome    Diverticulosis    EIC (epidermal inclusion cyst)    L upper back s/p excision    Elevated prostate specific antigen (PSA)    Environmental allergies    pollen, dust, trees, grass    Foreign body granuloma of skin and subcutaneous tissue    Lt neck    H/O cardiovascular stress test    Internal hemorrhoids    Irregular bowel habits    Pain in joint    Other specified sites    Paresthesia    Prostate cancer (HCC)    Renal cyst     tiny Lt upper pole    Seasonal allergies    Sleep apnea    Splenic cyst    lower pole splenic cyst    Stress    Tendinopathy of left rotator cuff    Thoracic outlet syndrome    Toe fracture, right    fracture of lateral aspect of base of distal phalanges of Rt great toe    Unspecified local infection of skin and subcutaneous tissue    Wears glasses      Past Surgical History:   Procedure Laterality Date    Appendectomy  1991    Cardioversion  , , , 2017    Colonoscopy  2004    to cecum    Electrocardiogram, complete  2013    Laparoscopy, surgical prostatectomy, retropubic radical, w/nerve sparing      Ndsc ablation & rcnstj atria lmtd w/o bypass      Other surgical history  2011    robotic-assisted laparoscopic raidcal prostatectomy & B/L pelvic lymph node dissection    Other surgical history  3/6/2003    excision of EIC on Lt upper back    Other surgical history  2003    biopsy of skin lesion from Lt neck      Family History   Problem Relation Age of Onset    Hypertension Father         High blood pressure    Macular degeneration Father     Arthritis Father         gout    Heart Disorder Father         AAA    Other Father         Aortic Aneurysm    Arthritis Mother         Rheumatoid    Other (Other) Mother     Other Mother         Parkinson's disease & rheumatoid arthritis    Breast Cancer Sister 33    Other (Other) Sister 63        Pancreatic CA    Cancer Brother         Lung     Other (Other) Brother         Throat CA    Ovarian Cancer Sister 61    Arthritis Sister         Rheumatoid      Breast Cancer Maternal Aunt 45    Breast Cancer Other         maternal uncle's daughter - early 50's, unlateral mastectomy    Cancer Brother          of lung cancer in     Cancer Sister          of pancreatic cancer    Cancer Sister         Treated for ovarian cancer    Other Sister         Rheumatoid arthritis      Social History     Socioeconomic History    Marital status:     Tobacco Use    Smoking status: Never    Smokeless tobacco: Never   Vaping Use    Vaping status: Never Used   Substance and Sexual Activity    Alcohol use: No     Alcohol/week: 0.0 standard drinks of alcohol    Drug use: No   Other Topics Concern    Seat Belt No    Special Diet No           REVIEW OF SYSTEMS:     No n/v/f/c. History of back pain.      EXAM:   There were no vitals taken for this visit.  GENERAL: well developed, well nourished, in no apparent distress  EXTREMITIES:   1. Integument: Normal skin temperature and turgor  2. Vascular: Dorsalis pedis two out of four bilateral and posterior tibial pulses two out of   four bilateral, capillary refill normal.   3. Musculoskeletal: All muscle groups are graded 5 out of 5 in the foot and ankle.  Prominent metatarsal heads.  Pain noted to third interspace of both feet with palpable click with the right worse than the left.  No pain noted to surrounding metatarsal shafts.   4. Neurological: Normal sharp dull sensation; reflexes normal.          ASSESSMENT AND PLAN:   Diagnoses and all orders for this visit:    Aceves neuroma, right  -     MRI FOOT, RIGHT (CPT=73718); Future    Neuritis    Onychomycosis  -     Dermatophyte Only, Culture [E]; Future    Right foot pain    Other orders  -     [facility] AMB POD XR - RT FOOT 2 VIEWS(AP, LATERAL)WT BEARING            Plan:    -Patient examined, chart history reviewed.  -Discussed etiology of condition and various treatment options.  -Patient's condition consistent with neuroma right (and possibly left) third interspace.  -Unfortunately he has not improved much with cortisone injections.  -His EMG was unremarkable.  -X-rays were obtained and reviewed today.  No acute fractures or osseous abnormalities.  -Will order MRI for further evaluation.  -Pending results could consider additional injections and/or surgical intervention.  If MRI is inconclusive we will likely refer patient for second opinion.  -Nail biopsy obtained of  right hallux nail to check for fungus.  Pending results would likely consider oral Lamisil.  - In the meantime patient should continue to ambulate with supportive shoes with wide toe box as well as inserts.    Will follow-up with MRI and nail biopsy results.    The patient indicates understanding of these issues and agrees to the plan.        Stephan Diallo DPM    Dragon speech recognition software was used to prepare this note.  Errors in word recognition may occur.  Please contact me with any questions/concerns with this note.

## 2025-07-18 ENCOUNTER — PATIENT MESSAGE (OUTPATIENT)
Dept: PODIATRY CLINIC | Facility: CLINIC | Age: 70
End: 2025-07-18

## 2025-07-18 DIAGNOSIS — L60.0 INGROWN NAIL: Primary | ICD-10-CM

## 2025-07-18 DIAGNOSIS — B35.1 ONYCHOMYCOSIS: ICD-10-CM

## 2025-07-18 RX ORDER — CEPHALEXIN 500 MG/1
500 CAPSULE ORAL 2 TIMES DAILY
Qty: 14 CAPSULE | Refills: 0 | Status: SHIPPED | OUTPATIENT
Start: 2025-07-18

## 2025-07-18 NOTE — TELEPHONE ENCOUNTER
S/w patient- He was agreeable to plan. He states that he would take the oral antibiotic and use epsom salt soaks. I told him that if he is still having concerns next week, he should give us a call to update us. I told him that in regards to the MRI, Dr Diallo could call him or he could come to office. He states that he would happy with a phone call. I told him that it would likely be sometime early August because Dr Diallo is out off office the last week in July. Patient verbalized understanding.    PIOTR

## 2025-07-18 NOTE — TELEPHONE ENCOUNTER
S/w patient- He states that he saw Dr Diallo on 7/2/25 for a few different concerns. 1 of the concerns was related to fungus of the toe. He states that the base of the nail was red when he saw Dr Diallo, but he has noted swelling of the base of the nail over the last few days. Denies drainage from the site. He states that nail specimen was taken at that time. He was wondering if there were any results that could be contributing to these symptoms. I informed patient that his results were negative for fungus through 2 weeks, but I emphasized that the entire culture is a 4-5 week growth period. Patient understood the results, but was wondering if there is any medication that he should take in the meantime. I told him that I would send the information to Dr Diallo to see what he would recommend. Patient states that he also has scheduled the MRI for next Thursday on 7/24/25. This appears to be to ordered to assess for possible neuroma of the foot.    Please advise on if you would recommend any oral medication for patient before results finalize. Please advise if toe soaking would be recommended and if patient should have sooner follow-up in clinic after his MRI. He is currently scheduled for 9/12/25 for follow-up visit.

## 2025-07-19 ENCOUNTER — HOSPITAL ENCOUNTER (OUTPATIENT)
Age: 70
Discharge: HOME | End: 2025-07-19
Payer: COMMERCIAL

## 2025-07-19 VITALS
HEART RATE: 65 BPM | TEMPERATURE: 98 F | OXYGEN SATURATION: 99 % | DIASTOLIC BLOOD PRESSURE: 72 MMHG | RESPIRATION RATE: 16 BRPM | SYSTOLIC BLOOD PRESSURE: 108 MMHG

## 2025-07-19 DIAGNOSIS — M79.89 TOE SWELLING: Primary | ICD-10-CM

## 2025-07-19 PROCEDURE — 99213 OFFICE O/P EST LOW 20 MIN: CPT | Performed by: PHYSICIAN ASSISTANT

## 2025-07-19 RX ORDER — MUPIROCIN 2 %
1 OINTMENT (GRAM) TOPICAL 2 TIMES DAILY
Qty: 1 G | Refills: 0 | Status: SHIPPED | OUTPATIENT
Start: 2025-07-19 | End: 2025-07-19

## 2025-07-19 RX ORDER — MUPIROCIN 2 %
1 OINTMENT (GRAM) TOPICAL 2 TIMES DAILY
Qty: 1 G | Refills: 0 | Status: SHIPPED | OUTPATIENT
Start: 2025-07-19 | End: 2025-07-26

## 2025-07-19 NOTE — ED PROVIDER NOTES
Chief Complaint   Patient presents with    Rash Skin Problem       History obtained from: patient   services not used    HPI:     Billy Basurto is a 70 year old male who presents with redness and swelling surrounding right great toe nail x 2-3 days. Patient currently being evaluated by podiatry for possible fungal infection to this toenail. Patient contacted podiatry yesterday about new redness and swelling at base of toenail and was prescribed cephalexin. Patient has taken two doses and is presenting today due to no improvement. Denies injury/trauma, bleeding, drainage, streaking redness, fevers, chills.     PMH  Past Medical History[1]    PFSH    PFS asessment screens reviewed and agree.  Nurses notes reviewed I agree with documentation.    Family History[2]  Family history reviewed with patient/caregiver and is not pertinent to presenting problem.  Social History     Socioeconomic History    Marital status:      Spouse name: Not on file    Number of children: Not on file    Years of education: Not on file    Highest education level: Not on file   Occupational History    Not on file   Tobacco Use    Smoking status: Never     Passive exposure: Never    Smokeless tobacco: Never   Vaping Use    Vaping status: Never Used   Substance and Sexual Activity    Alcohol use: No     Alcohol/week: 0.0 standard drinks of alcohol    Drug use: No    Sexual activity: Not on file   Other Topics Concern    Caffeine Concern Not Asked    Exercise Not Asked    Seat Belt No    Special Diet No    Stress Concern Not Asked    Weight Concern Not Asked   Social History Narrative    Not on file     Social Drivers of Health     Food Insecurity: Not on file   Transportation Needs: Not on file   Housing Stability: Not on file         ROS:   Positive for stated complaint: redness and swelling to base of right great toenail   Other systems are as noted in HPI.   All other systems reviewed and negative except as noted  above.    Physical Exam:   Vital signs and nursing note reviewed.       /72   Pulse 65   Temp 97.8 °F (36.6 °C) (Oral)   Resp 16   SpO2 99%     GENERAL: well developed, no acute distress, non-toxic appearing   SKIN: good skin turgor, no obvious rashes  HEAD: normocephalic, atraumatic  EYES: sclera non-icteric bilaterally, conjunctiva clear bilaterally  OROPHARYNX: MMM, maintaining airway and secretions  NECK: no nuchal rigidity, no trismus, no edema, phonation normal    CARDIO: regular rate, DP pulse 2+ bilaterally, cap refill < 2 sec   LUNGS: no increased WOB  EXTREMITIES: swelling and tenderness to proximal nailfold of right great toenail, skin intact with overlying erythema, no streaking erythema, no fluctuance, no drainage or bleeding, nail intact, no bony tenderness, FROM, compartments soft, CMS intact  NEURO: no focal deficits  PSYCH: alert and oriented x3, answering questions appropriately, mood appropriate    MDM/Assessment/Plan:   Orders for this encounter:    Orders Placed This Encounter    mupirocin 2 % External Ointment     Sig: Apply 1 Application topically 2 (two) times daily for 7 days.     Dispense:  1 g     Refill:  0       Labs performed this visit:  No results found for this or any previous visit (from the past 10 hours).    Imaging performed this visit:  No orders to display       Medical Decision Making  DDx includes paronychia versus cellulitis versus erysipelas versus other.  Patient is overall very well-appearing with stable vitals. No signs or symptoms of systemic illness. Erythema, swelling, and tenderness noted to proximal base of right great toenail. No area of fluctuance or visible pus therefore shared decision-making employed with patient to defer I&D at this time. Recommend patient continue previously prescribed cephalexin as instructed by podiatry. Rx mupirocin ointment.  Discussed supportive care including rest, warm soaks, and OTC Tylenol/Motrin as needed for pain.   Instructed patient to go directly to nearest ER with any worsening or concerning symptoms.  Follow-up with podiatry.    Amount and/or Complexity of Data Reviewed  External Data Reviewed: notes.     Details: Podiatry     Risk  OTC drugs.  Prescription drug management.        Diagnosis:    ICD-10-CM    1. Toe swelling  M79.89           All results reviewed and discussed with patient/patient's family. Patient/patient's family verbalize excellent understanding of instructions and feels comfortable with plan. All of patient's/patient's family's questions were addressed.   See AVS for detailed discharge instructions for your condition today.    Follow Up with:  Stephan Diallo DPM  [address] [address]  [phone]    Schedule an appointment as soon as possible for a visit         Note: This document was dictated using Dragon medical dictation software.  Proofreading was performed to the best of my ability, but errors may be present.    Jasmyn Martin PA-C         [1]   Past Medical History:   Allergic rhinitis    Some years worse than others    Anxiety    Atrial fibrillation (HCC)    Back pain    Cancer (HCC)    Prostrate (had surgery)    Cholelithiasis    Chronic hypotension    typically asymptomatic    Cubital tunnel syndrome    Diverticulosis    EIC (epidermal inclusion cyst)    L upper back s/p excision    Elevated prostate specific antigen (PSA)    Environmental allergies    pollen, dust, trees, grass    Foreign body granuloma of skin and subcutaneous tissue    Lt neck    H/O cardiovascular stress test    Internal hemorrhoids    Irregular bowel habits    Pain in joint    Other specified sites    Paresthesia    Prostate cancer (HCC)    Renal cyst    tiny Lt upper pole    Seasonal allergies    Sleep apnea    Splenic cyst    lower pole splenic cyst    Stress    Tendinopathy of left rotator cuff    Thoracic outlet syndrome    Toe fracture, right    fracture of lateral aspect of base of  distal phalanges of Rt great toe    Unspecified local infection of skin and subcutaneous tissue    Wears glasses   [2]   Family History  Problem Relation Age of Onset    Hypertension Father         High blood pressure    Macular degeneration Father     Arthritis Father         gout    Heart Disorder Father         AAA    Other Father         Aortic Aneurysm    Arthritis Mother         Rheumatoid    Other (Other) Mother     Other Mother         Parkinson's disease & rheumatoid arthritis    Breast Cancer Sister 33    Other (Other) Sister 63        Pancreatic CA    Cancer Brother         Lung     Other (Other) Brother         Throat CA    Ovarian Cancer Sister 61    Arthritis Sister         Rheumatoid      Breast Cancer Maternal Aunt 45    Breast Cancer Other         maternal uncle's daughter - early 50's, unlateral mastectomy    Cancer Brother          of lung cancer in     Cancer Sister          of pancreatic cancer    Cancer Sister         Treated for ovarian cancer    Other Sister         Rheumatoid arthritis

## 2025-07-19 NOTE — ED INITIAL ASSESSMENT (HPI)
Pt began with a possible fungal infection on his great toe.  It was not fungal in nature so far, but in the past 2 days his rt great toe has become red and swollen  he was given an antibiotic from Dr Abernathy office and he has only taken 2 , but feels it is not improving

## 2025-07-19 NOTE — DISCHARGE INSTRUCTIONS
Soak toe 3-4 times daily in warm water   Apply mupirocin ointment after soaking   Keep toe clean and dry   Continue taking previously prescribed cephalexin   Follow up with your podiatrist     If you experience severe/worsening pain or swelling, difficulty moving or bending toe, fever, or any other concerning symptom, go to nearest ER immediately

## 2025-07-20 ENCOUNTER — HOSPITAL ENCOUNTER (OUTPATIENT)
Age: 70
Discharge: HOME | End: 2025-07-20
Payer: COMMERCIAL

## 2025-07-20 VITALS
TEMPERATURE: 98 F | RESPIRATION RATE: 20 BRPM | HEART RATE: 62 BPM | OXYGEN SATURATION: 97 % | SYSTOLIC BLOOD PRESSURE: 105 MMHG | DIASTOLIC BLOOD PRESSURE: 66 MMHG

## 2025-07-20 DIAGNOSIS — B35.1 ONYCHOMYCOSIS: ICD-10-CM

## 2025-07-20 DIAGNOSIS — L60.0 INGROWN NAIL: ICD-10-CM

## 2025-07-20 DIAGNOSIS — L03.031 CELLULITIS OF GREAT TOE OF RIGHT FOOT: Primary | ICD-10-CM

## 2025-07-20 RX ORDER — CEPHALEXIN 500 MG/1
500 CAPSULE ORAL 4 TIMES DAILY
Qty: 28 CAPSULE | Refills: 0 | Status: SHIPPED | OUTPATIENT
Start: 2025-07-20 | End: 2025-07-27

## 2025-07-20 RX ORDER — SULFAMETHOXAZOLE AND TRIMETHOPRIM 800; 160 MG/1; MG/1
1 TABLET ORAL 2 TIMES DAILY
Qty: 14 TABLET | Refills: 0 | Status: SHIPPED | OUTPATIENT
Start: 2025-07-20 | End: 2025-07-27

## 2025-07-20 NOTE — ED PROVIDER NOTES
Patient Seen in: [facility]        History  Chief Complaint   Patient presents with    Toe Pain     Stated Complaint: Rt big toe bleeding (infected)    Subjective:   HPI          Billy Basurto is a 70 year old male presents with reported swelling   localized to right great toe for over 4 days.  Patient reports increased swelling, warmth, tenderness to palpation.  Prescribed keflex (BID) by podiatry without relief.  Seen in this facility on 7/19/2025 and prescribed mupirocin ointment  without relief.  Today, patient presents with increased redness and swelling.  No associated fevers, chills, discharge, inciting injury/ trauma.  Pain 4/ 10 and is constant in nature. No alleviating/ aggravating factors.  No medications taken prior to arrival.            Objective:     Past Medical History:    Allergic rhinitis    Some years worse than others    Anxiety    Atrial fibrillation (HCC)    Back pain    Cancer (HCC)    Prostrate (had surgery)    Cholelithiasis    Chronic hypotension    typically asymptomatic    Cubital tunnel syndrome    Diverticulosis    EIC (epidermal inclusion cyst)    L upper back s/p excision    Elevated prostate specific antigen (PSA)    Environmental allergies    pollen, dust, trees, grass    Foreign body granuloma of skin and subcutaneous tissue    Lt neck    H/O cardiovascular stress test    Internal hemorrhoids    Irregular bowel habits    Pain in joint    Other specified sites    Paresthesia    Prostate cancer (HCC)    Renal cyst    tiny Lt upper pole    Seasonal allergies    Sleep apnea    Splenic cyst    lower pole splenic cyst    Stress    Tendinopathy of left rotator cuff    Thoracic outlet syndrome    Toe fracture, right    fracture of lateral aspect of base of distal phalanges of Rt great toe    Unspecified local infection of skin and subcutaneous tissue    Wears glasses              Past Surgical History:   Procedure Laterality Date    Appendectomy  8/1991     Cardioversion  2011, 2015, 2016, 2017    Colonoscopy  5/7/2004    to cecum    Electrocardiogram, complete  12/6/2013    Laparoscopy, surgical prostatectomy, retropubic radical, w/nerve sparing      Ndsc ablation & rcnstj atria lmtd w/o bypass      Other surgical history  6/29/2011    robotic-assisted laparoscopic raidcal prostatectomy & B/L pelvic lymph node dissection    Other surgical history  3/6/2003    excision of EIC on Lt upper back    Other surgical history  2/6/2003    biopsy of skin lesion from Lt neck                Social History     Socioeconomic History    Marital status:    Tobacco Use    Smoking status: Never     Passive exposure: Never    Smokeless tobacco: Never   Vaping Use    Vaping status: Never Used   Substance and Sexual Activity    Alcohol use: No     Alcohol/week: 0.0 standard drinks of alcohol    Drug use: No   Other Topics Concern    Seat Belt No    Special Diet No              Review of Systems   All other systems reviewed and are negative.      Positive for stated complaint: Rt big toe bleeding (infected)  Other systems are as noted in HPI.  Constitutional and vital signs reviewed.      All other systems reviewed and negative except as noted above.                  Physical Exam    ED Triage Vitals [07/20/25 0818]   /66   Pulse 62   Resp 20   Temp 97.7 °F (36.5 °C)   Temp src Oral   SpO2 97 %   O2 Device None (Room air)       Current Vitals:   Vital Signs  BP: 105/66  Pulse: 62  Resp: 20  Temp: 97.7 °F (36.5 °C)  Temp src: Oral    Oxygen Therapy  SpO2: 97 %  O2 Device: None (Room air)            Physical Exam  Vitals and nursing note reviewed.   Constitutional:       General: He is not in acute distress.     Appearance: Normal appearance. He is normal weight. He is not ill-appearing, toxic-appearing or diaphoretic.   HENT:      Head: Normocephalic and atraumatic.      Nose: Nose normal.   Eyes:      Extraocular Movements: Extraocular movements intact.      Pupils: Pupils  are equal, round, and reactive to light.   Cardiovascular:      Rate and Rhythm: Normal rate.      Pulses: Normal pulses.   Pulmonary:      Effort: Pulmonary effort is normal.      Breath sounds: Normal breath sounds.   Musculoskeletal:         General: Swelling and tenderness present. No deformity or signs of injury. Normal range of motion.      Cervical back: Normal range of motion and neck supple.      Right lower leg: No edema.      Left lower leg: No edema.   Skin:     General: Skin is warm and dry.      Capillary Refill: Capillary refill takes less than 2 seconds.      Coloration: Skin is not jaundiced or pale.      Findings: Erythema present. No bruising, lesion or rash.      Comments: Soft tissue swelling with fluctuance to base of nailbed of right great toenail otherwise capillary refill less than 2 seconds, DP/ PT pulses intact 2+ bilaterally      Neurological:      General: No focal deficit present.      Mental Status: He is alert and oriented to person, place, and time. Mental status is at baseline.   Psychiatric:         Mood and Affect: Mood normal.         Behavior: Behavior normal.         Thought Content: Thought content normal.         Judgment: Judgment normal.                 ED Course  Labs Reviewed - No data to display  No orders to display     Medications - No data to display  Vitals:    07/20/25 0818   BP: 105/66   Pulse: 62   Resp: 20   Temp: 97.7 °F (36.5 °C)     Abscess Procedure   Location: right great toe  Size: 0.5cm  Anesthetized with 5mL lidocaine without epinephrine  Area prepped with betadine   Incision: single straight extending to dermis with 11 blade scalpel  Discharge: scant bloody discharge     Dressing applied with non adherent and Kerlix    Patient  tolerated procedure well                         MDM            Medical Decision Making  70 year old well appearing male presents with increased redness, swelling, and tenderness to right great toe.  Considerations include  cellulitis vs paronychia vs gout flare.    Plan  - I and D  - dressing applied to incision site   - return to ED for wound check in 48 hours, if needed   - Rx: switch keflex 500mg po from BID to every 6 hours and add on  bactrim ds po BID x 7 days.    - OTC ibuprofen 600mg po q8 hours/ prn. Tylenol 1g po q 6 hours/ prn.    - discharge to home   - return to ED sooner if worsening symptoms should occur including but not limited to fevers, chills, worsening pain, increased discharge.        Disposition and Plan     Clinical Impression:  1. Cellulitis of great toe of right foot    2. Onychomycosis    3. Ingrown nail         Disposition:  Discharge  7/20/2025  8:54 am    Follow-up:  Golden Fontenot MD  [address] [place] [id number] [phone]                Medications Prescribed:  Discharge Medication List as of 7/20/2025  8:56 AM        START taking these medications    Details   !! cephALEXin 500 MG Oral Cap Take 1 capsule (500 mg total) by mouth 4 (four) times daily for 7 days., Normal, Disp-28 capsule, R-0      sulfamethoxazole-trimethoprim -160 MG Oral Tab per tablet Take 1 tablet by mouth 2 (two) times daily for 7 days., Normal, Disp-14 tablet, R-0       !! - Potential duplicate medications found. Please discuss with provider.                Supplementary Documentation:

## 2025-07-21 ENCOUNTER — TELEPHONE (OUTPATIENT)
Dept: PODIATRY CLINIC | Facility: CLINIC | Age: 70
End: 2025-07-21

## 2025-07-21 ENCOUNTER — OFFICE VISIT (OUTPATIENT)
Dept: PODIATRY CLINIC | Facility: CLINIC | Age: 70
End: 2025-07-21
Payer: COMMERCIAL

## 2025-07-21 VITALS — SYSTOLIC BLOOD PRESSURE: 110 MMHG | DIASTOLIC BLOOD PRESSURE: 68 MMHG | HEART RATE: 66 BPM | OXYGEN SATURATION: 95 %

## 2025-07-21 DIAGNOSIS — M79.671 RIGHT FOOT PAIN: ICD-10-CM

## 2025-07-21 DIAGNOSIS — G57.62 MORTON NEUROMA, LEFT: ICD-10-CM

## 2025-07-21 DIAGNOSIS — G57.61 MORTON NEUROMA, RIGHT: ICD-10-CM

## 2025-07-21 DIAGNOSIS — M79.2 NEURITIS: ICD-10-CM

## 2025-07-21 DIAGNOSIS — L60.0 INGROWN NAIL: Primary | ICD-10-CM

## 2025-07-21 DIAGNOSIS — B35.1 ONYCHOMYCOSIS: ICD-10-CM

## 2025-07-21 PROCEDURE — 3074F SYST BP LT 130 MM HG: CPT | Performed by: STUDENT IN AN ORGANIZED HEALTH CARE EDUCATION/TRAINING PROGRAM

## 2025-07-21 PROCEDURE — 99214 OFFICE O/P EST MOD 30 MIN: CPT | Performed by: STUDENT IN AN ORGANIZED HEALTH CARE EDUCATION/TRAINING PROGRAM

## 2025-07-21 PROCEDURE — 3078F DIAST BP <80 MM HG: CPT | Performed by: STUDENT IN AN ORGANIZED HEALTH CARE EDUCATION/TRAINING PROGRAM

## 2025-07-21 NOTE — TELEPHONE ENCOUNTER
S/w pateint- Patient went to  twice over the weekend to assess the toe. He states that he had the base of the toe drained on Sunday. Patient was started on an additional antibiotic as well. He was recommended to come to clinic this week to see Dr Diallo. He was able to send updated photo from this morning. Patient states that there is considerable improvement in the toe since draining. He would still like to come to see Dr Diallo this week. I booked patient for 4pm this afternoon. Address provided.

## 2025-07-21 NOTE — TELEPHONE ENCOUNTER
Noted, can we please offer follow up visit with me, Dr. Orlando, or Dinorah if nail symptoms are persisting? Thanks

## 2025-07-21 NOTE — TELEPHONE ENCOUNTER
Can we please offer follow up visit with me, Dinorah, or Dr. Orlando is nail symptoms are persisting? Thank you.

## 2025-07-24 ENCOUNTER — HOSPITAL ENCOUNTER (OUTPATIENT)
Dept: MRI IMAGING | Age: 70
Discharge: HOME | End: 2025-07-24
Attending: STUDENT IN AN ORGANIZED HEALTH CARE EDUCATION/TRAINING PROGRAM
Payer: COMMERCIAL

## 2025-07-24 ENCOUNTER — PATIENT MESSAGE (OUTPATIENT)
Dept: PODIATRY CLINIC | Facility: CLINIC | Age: 70
End: 2025-07-24

## 2025-07-24 DIAGNOSIS — G57.61 MORTON NEUROMA, RIGHT: ICD-10-CM

## 2025-07-24 PROCEDURE — 73718 MRI LOWER EXTREMITY W/O DYE: CPT | Performed by: STUDENT IN AN ORGANIZED HEALTH CARE EDUCATION/TRAINING PROGRAM

## 2025-07-24 NOTE — TELEPHONE ENCOUNTER
S/w Dr Diallo- He states that there is some improvement in the photos, but he still notes that purple color on the base of the nail. He stated that we could add patient on tomorrow morning to have it assessed before he leaves next week.    S/w patient and booked him for 9am on 7/25/25

## 2025-07-25 ENCOUNTER — OFFICE VISIT (OUTPATIENT)
Dept: PODIATRY CLINIC | Facility: CLINIC | Age: 70
End: 2025-07-25
Payer: COMMERCIAL

## 2025-07-25 DIAGNOSIS — M54.16 LUMBAR RADICULOPATHY: ICD-10-CM

## 2025-07-25 DIAGNOSIS — M79.671 RIGHT FOOT PAIN: ICD-10-CM

## 2025-07-25 DIAGNOSIS — M79.2 NEURITIS: ICD-10-CM

## 2025-07-25 DIAGNOSIS — L60.0 INGROWN NAIL: Primary | ICD-10-CM

## 2025-07-25 DIAGNOSIS — G57.62 MORTON NEUROMA, LEFT: ICD-10-CM

## 2025-07-25 DIAGNOSIS — G57.61 MORTON NEUROMA, RIGHT: ICD-10-CM

## 2025-07-25 DIAGNOSIS — B35.1 ONYCHOMYCOSIS: ICD-10-CM

## 2025-07-25 PROCEDURE — 99213 OFFICE O/P EST LOW 20 MIN: CPT | Performed by: STUDENT IN AN ORGANIZED HEALTH CARE EDUCATION/TRAINING PROGRAM

## 2025-07-30 ENCOUNTER — OFFICE VISIT (OUTPATIENT)
Dept: PODIATRY CLINIC | Facility: CLINIC | Age: 70
End: 2025-07-30

## 2025-07-30 DIAGNOSIS — Z98.890 STATUS POST NAIL SURGERY: Primary | ICD-10-CM

## 2025-07-30 PROCEDURE — 99213 OFFICE O/P EST LOW 20 MIN: CPT | Performed by: PODIATRIST

## 2025-08-11 ENCOUNTER — OFFICE VISIT (OUTPATIENT)
Dept: PODIATRY CLINIC | Facility: CLINIC | Age: 70
End: 2025-08-11

## 2025-08-11 DIAGNOSIS — Z98.890 STATUS POST NAIL SURGERY: Primary | ICD-10-CM

## 2025-08-11 DIAGNOSIS — B35.1 ONYCHOMYCOSIS: ICD-10-CM

## 2025-08-11 DIAGNOSIS — L60.0 INGROWN NAIL: ICD-10-CM

## 2025-08-11 DIAGNOSIS — M79.671 RIGHT FOOT PAIN: ICD-10-CM

## 2025-08-11 DIAGNOSIS — G57.61 MORTON NEUROMA, RIGHT: ICD-10-CM

## 2025-08-11 DIAGNOSIS — M79.2 NEURITIS: ICD-10-CM

## (undated) NOTE — MR AVS SNAPSHOT
[address]  [address] [facility] [address] [phone]               Thank you for choosing us for your health care visit with Judge Stephanie PA-C.   We are glad to serve you and happy to provide you with These medications were sent to [facility] [address], [facility], [address] [place] [address]     Phone:  [phone]    - Cyclobenzaprine HCl 10 MG Tabs  - methylPREDNISolone 4 MG Tbpk \"Treatment: Evaluate & Treat  Physician goals: Pain relief, Increased Function and Education  Region Specific: Neck / Back  Rehab potential: Good  Frequency: 2-3 times per week  Duration: 4 wks  Number of visits: 12\"    Functional Status questions compl

## (undated) NOTE — ED AVS SNAPSHOT
[facility] Emergency Department    [place]  [address] [address]    Phone:  [phone]    Fax:  Ashok Herbert   MRN: [id number]    Department:  [facility] Emergency Department   Date of Visit:  5 Medication List      START taking these medications     Flecainide Acetate 100 MG Tabs   Quantity:  60 tablet   Commonly known as:  TAMBOCOR   Take 1 tablet (100 mg total) by mouth 2 (two) times daily.             Where to Get Your Medications      You can to a primary care or a specialist physician for a follow-up visit, please tell this physician (or your personal doctor if your instructions are to return to your personal doctor) about any new or lasting problems.  The primary care or specialist physician w We are concerned for your overall well being:    - If you are a smoker or have smoked in the last 12 months, we encourage you to explore options for quitting.     - If you have concerns related to behavioral health issues or thoughts of harming yourself, THORACIC AORTA:  No thoracic aortic aneurysm. LUNGS:  No focal pulmonary infiltrate or consolidation. Bilateral subsegmental atelectasis. MEDIASTINUM/RENEE:  No enlarged adenopathy. CARDIAC:  Trace pericardial effusion. PLEURA:  No pleural effusion.   JEREMY Dictated by: Nohemy Sanchez DO on 5/03/2017 at 9:53       Approved by: Nohemy Sanchez DO              Narrative:    PROCEDURE:  XR CHEST AP PORTABLE (CPT=71010)     TECHNIQUE:  AP chest radiograph was obtained.      COMPARISON:  [facility] , XR CHEST AP PORTABLE  (

## (undated) NOTE — MR AVS SNAPSHOT
[address]  [address] [facility] [address] [phone]               Thank you for choosing us for your health care visit with Gary Mejias PA-C.   We are glad to serve you and happy to provide you with aspirin 81 MG Tabs   Take 81 mg by mouth daily. Cyclobenzaprine HCl 10 MG Tabs   Take 1 tablet (10 mg total) by mouth 2 (two) times daily as needed for Muscle spasms.    Commonly known as:  cyclobenzaprine           DAILY MARTÍNEZ Tabs   Take 1 table Tips for increasing your physical activity – Adults who are physically active are less likely to develop some chronic diseases than adults who are inactive.      HOW TO GET STARTED: HOW TO STAY MOTIVATED:   Start activities slowly and build up over time Do

## (undated) NOTE — Clinical Note
AUTHORIZATION FOR SURGICAL OPERATION OR OTHER PROCEDURE    1. I hereby authorize Dr. WU PHYSICIAN:[id number]}, and [facility] staff assigned to my case to perform the following operation and/or procedure at the [facility] site:    _______________________________________________________________________________________________      _______________________________________________________________________________________________    2.  My physician has explained the nature and purpose of the operation or other procedure, possible alternative methods of treatment, the risks involved, and the possibility of complication to me.  I acknowledge that no guarantee has been made as to the result that may be obtained.  3.  I recognize that, during the course of this operation, or other procedure, unforseen conditions may necessitate additional or different procedure than those listed above.  I, therefore, further authorize and request that the above named physician, his/her physician assistants or designees perform such procedures as are, in his/her professional opinion, necessary and desirable.  4.  Any tissue or organs removed in the operation or other procedure may be disposed of by and at the discretion of the [facility] and [facility].  5.  I understand that in the event of a medical emergency, I will be transported by local paramedics to [facility] or other hospital emergency department.  6.  I certify that I have read and fully understand the above consent to operation and/or other procedure.    7.  I acknowledge that my physician has explained sedation/analgesia administration to me including the risks and benefits.  I consent to the administration of sedation/analgesia as may be necessary or desirable in the judgement of my physician.    Witness signature: ___________________________________________________ Date:  ______/______/_____                    Time:   ________ A.M.  P.M.       Patient Name:  ______________________________________________________  (please print)      Patient signature:  ___________________________________________________             Relationship to Patient:           []  Parent    Responsible person                          []  Spouse  In case of minor or                    [] Other  _____________   Incompetent name:  __________________________________________________                               (please print)      _____________      Responsible person  In case of minor or  Incompetent signature:  _______________________________________________    Statement of Physician  My signature below affirms that prior to the time of the procedure, I have explained to the patient and/or his/her guardian, the risks and benefits involved in the proposed treatment and any reasonable alternative to the proposed treatment.  I have also explained the risks and benefits involved in the refusal of the proposed treatment and have answered the patient's questions.                        Date:  ______/______/_______  Provider                      Signature:  __________________________________________________________       Time:  ___________ A.M    P.M.

## (undated) NOTE — ED AVS SNAPSHOT
[facility] Emergency Department    [place] [address] [address]    Phone:  [phone]    Fax:  Ashok Herbert   MRN: [id number]    Department:  [facility] Emergency Department   Date of Visit:  5 IF THERE IS ANY CHANGE OR WORSENING OF YOUR CONDITION, CALL YOUR PRIMARY CARE PHYSICIAN AT ONCE OR RETURN IMMEDIATELY TO THE EMERGENCY DEPARTMENT.     If you have been prescribed any medication(s), please fill your prescription right away and begin taking t

## (undated) NOTE — LETTER
AUTHORIZATION FOR SURGICAL OPERATION OR OTHER PROCEDURE    1. I hereby authorize Dr. Diallo, and [facility] staff assigned to my case to perform the following operation and/or procedure at the [facility] site:    _______________________________________________________________________________________________    Right foot cortisone injection   _______________________________________________________________________________________________    2.  My physician has explained the nature and purpose of the operation or other procedure, possible alternative methods of treatment, the risks involved, and the possibility of complication to me.  I acknowledge that no guarantee has been made as to the result that may be obtained.  3.  I recognize that, during the course of this operation, or other procedure, unforseen conditions may necessitate additional or different procedure than those listed above.  I, therefore, further authorize and request that the above named physician, his/her physician assistants or designees perform such procedures as are, in his/her professional opinion, necessary and desirable.  4.  Any tissue or organs removed in the operation or other procedure may be disposed of by and at the discretion of the [facility] and [facility].  5.  I understand that in the event of a medical emergency, I will be transported by local paramedics to [facility] or other hospital emergency department.  6.  I certify that I have read and fully understand the above consent to operation and/or other procedure.    7.  I acknowledge that my physician has explained sedation/analgesia administration to me including the risks and benefits.  I consent to the administration of sedation/analgesia as may be necessary or desirable in the judgement of my physician.    Witness signature: ___________________________________________________ Date:  ______/______/_____                     Time:  ________ A.M.  P.M.       Patient Name:  ______________________________________________________  (please print)      Patient signature:  ___________________________________________________             Relationship to Patient:           []  Parent    Responsible person                          []  Spouse  In case of minor or                    [] Other  _____________   Incompetent name:  __________________________________________________                               (please print)      _____________      Responsible person  In case of minor or  Incompetent signature:  _______________________________________________    Statement of Physician  My signature below affirms that prior to the time of the procedure, I have explained to the patient and/or his/her guardian, the risks and benefits involved in the proposed treatment and any reasonable alternative to the proposed treatment.  I have also explained the risks and benefits involved in the refusal of the proposed treatment and have answered the patient's questions.                        Date:  ______/______/_______  Provider                      Signature:  __________________________________________________________       Time:  ___________ A.M    P.M.

## (undated) NOTE — LETTER
AUTHORIZATION FOR SURGICAL OPERATION OR OTHER PROCEDURE    1. I hereby authorize Dr. Stephan Diallo, and [facility] staff assigned to my case to perform the following operation and/or procedure at the [facility] site:    _______________________________________________________________________________________________    Cortisone Injection Left Foot  _______________________________________________________________________________________________    2.  My physician has explained the nature and purpose of the operation or other procedure, possible alternative methods of treatment, the risks involved, and the possibility of complication to me.  I acknowledge that no guarantee has been made as to the result that may be obtained.  3.  I recognize that, during the course of this operation, or other procedure, unforseen conditions may necessitate additional or different procedure than those listed above.  I, therefore, further authorize and request that the above named physician, his/her physician assistants or designees perform such procedures as are, in his/her professional opinion, necessary and desirable.  4.  Any tissue or organs removed in the operation or other procedure may be disposed of by and at the discretion of the [facility] and [facility].  5.  I understand that in the event of a medical emergency, I will be transported by local paramedics to [facility] or other hospital emergency department.  6.  I certify that I have read and fully understand the above consent to operation and/or other procedure.    7.  I acknowledge that my physician has explained sedation/analgesia administration to me including the risks and benefits.  I consent to the administration of sedation/analgesia as may be necessary or desirable in the judgement of my physician.    Witness signature: ___________________________________________________ Date:   ______/______/_____                    Time:  ________ A.M.  P.M.       Patient Name:  ______________________________________________________  (please print)      Patient signature:  ___________________________________________________             Relationship to Patient:           []  Parent    Responsible person                          []  Spouse  In case of minor or                    [] Other  _____________   Incompetent name:  __________________________________________________                               (please print)      _____________      Responsible person  In case of minor or  Incompetent signature:  _______________________________________________    Statement of Physician  My signature below affirms that prior to the time of the procedure, I have explained to the patient and/or his/her guardian, the risks and benefits involved in the proposed treatment and any reasonable alternative to the proposed treatment.  I have also explained the risks and benefits involved in the refusal of the proposed treatment and have answered the patient's questions.                        Date:  ______/______/_______  Provider                      Signature:  __________________________________________________________       Time:  ___________ A.M    P.M.

## (undated) NOTE — ED AVS SNAPSHOT
[facility] Emergency Department    [facility]  [address] [address]    Phone:  [phone]    Fax:  Ashok Herbert   MRN: [id number]    Department:  [facility] Emergency Department   Date of Visit:  4 IF THERE IS ANY CHANGE OR WORSENING OF YOUR CONDITION, CALL YOUR PRIMARY CARE PHYSICIAN AT ONCE OR RETURN IMMEDIATELY TO THE EMERGENCY DEPARTMENT.     If you have been prescribed any medication(s), please fill your prescription right away and begin taking t

## (undated) NOTE — ED AVS SNAPSHOT
[facility] Emergency Department    [facility]  [address] [address]    Phone:  [phone]    Fax:  Ashok Herbert   MRN: [id number]    Department:  [facility] Emergency Department   Date of Visit:  1 If you have any problems with your follow-up, please call our  at [phone]    Si usted tiene algun problema con chavez sequimiento, por favor llame a nuestro adminstrador de casos al [phone]    Expect to receive an electronic request Nesha Euceda [address] [facility] ([address]) 1000 [facility] [address] ([address] [facility] [address]) [address]  ([address]) [address] [facility] INDICATIONS:  fatigue/weakness     PATIENT STATED HISTORY:  Patient presents with weakness and fatigue since this afternoon and a tingly sensation in his extermitites since 1830 today. No chest compliants.       Findings:     Cardiac silhouette and pulmonar

## (undated) NOTE — ED AVS SNAPSHOT
[facility] Emergency Department    [place] [address]    Phone:  [phone]    Fax:  Ashok Herbert   MRN: [id number]    Department:  [facility] Emergency Department   Date of Visit:  4 coverage for follow-up care and referrals. [address] [place] [phone]  Pediatric [phone] Emergency Department   [phone]       To Check ER Wait Times:  TEXT 'ERwait' to 306[address]      Click [email or web address] will be contacted. Please make sure we have your correct phone number before you leave. After you leave, you should follow the attached instructions. I have read and understand the instructions given to me by my caregivers.         [facility] You can access your MyChart to more actively manage your health care and view more details from this visit by going to [email or web address].   If you've recently had a stay at the Hospital you can access your discharge instructions in [address] by leslie

## (undated) NOTE — MR AVS SNAPSHOT
[address]  [address] [address] [phone]               Thank you for choosing us for your health care visit with Dejuan Pantoja MD.  We are glad to serve you and happy to provide you with this chavez When this happens, the atria also have a harder time moving blood into the ventricles. Blood may then pool in the atria, which increases the risk for blood clots and stroke. The ventricles also may contract too quickly and irregularly.  As a result, they ma control the heart rate and regularity of the heartbeat. · Surgery. During surgery, your healthcare provider may use different methods to create scar tissue in the areas of the heart causing the abnormal signals.  The scar tissue disrupts the abnormal signa discharge instructions in [employer] by going to Visits < Admission Summaries. If you've been to the Emergency Department or your doctor's office, you can view your past visit information in [employer] by going to Visits < Visit Summaries. Alliqua questions?

## (undated) NOTE — ED AVS SNAPSHOT
[facility] Emergency Department    [facility]  [address] [address]    Phone:  [phone]    Fax:  Ashok Herbert   MRN: [id number]    Department:  [facility] Emergency Department   Date of Visit:  1 IF THERE IS ANY CHANGE OR WORSENING OF YOUR CONDITION, CALL YOUR PRIMARY CARE PHYSICIAN AT ONCE OR RETURN IMMEDIATELY TO THE EMERGENCY DEPARTMENT.     If you have been prescribed any medication(s), please fill your prescription right away and begin taking t

## (undated) NOTE — IP AVS SNAPSHOT
[facility] [place] [address] [place], [address] ~ [phone]                Discharge Summary   4/13/2017    Vandana Hudson           Admission Information        Provider Department    4/13/2017 Marizol Ward MD  2ne-A Follow-up Information     Follow up with Janay Kenney MD.    Specialties:  Internal Medicine, IP Consult to Primary Care    Contact information:    [address] [place] [address] 0[address] [facility] [address] [phone]          Follow up with Franki Macedo, ALT Bilirubin,Total Total Protein Albumin Sodium Potassium Chloride    (04/09/17)  19 (04/09/17)  0.7 (04/09/17)  6.9 (04/09/17)  4.0 (04/09/17)  141 (04/13/17)  4.0 (04/09/17)  106      Radiology Exams     None      Patient Belongings       Most Recent V Medication Side Effects - Medications to be taken at home  As your caregivers, we want you to be aware of the medications you are prescribed to take and their potential SIDE EFFECTS.  Your nurse will review your medications with you before you are discharge (itching, rash, hives)   What to report to your healthcare team: Pain, nausea/vomiting, no bowel movement in 2+ days, diarrhea           All Other Medications     Multiple Vitamin (DAILY MARTÍNEZ) Oral Tab

## (undated) NOTE — Clinical Note
Patient Name: Billy Basurto  : 1/3/1955  MRN: [id number]  Patient Address: [address] [place] [address]      COVID-19 2022      [facility] is committed to the safety and well-being of our patients, members, employees, and communities. As the COVID-19 pandemic continues to evolve, [facility] is here to provide community members with reliable answers to any questions they may have.     Please review this entire document. It includes information related to exposure, pending tests, positive results and aftercare.     Reliable, evidence-based treatment options for COVID-19 are extremely limited. People with COVID-19 should receive supportive care to help relieve symptoms.       Patients with pending COVID-19 test results should follow all care and home isolation instructions. An [facility] representative will call with your test results. Results will also be available on [employer].     Home Isolation  If you have tested positive for COVID-19, you should remain under home isolation and follow the below guidelines:  ? Stay home for five days.  ? If you have no symptoms or your symptoms are resolving after five days, you can leave your house.  ? Continue to wear a mask around others for five additional days.  ? If you have a fever, continue to stay home until your fever resolves.  If you have a fever with cough or shortness of breath but have not been exposed to someone with COVID-19 and have not tested positive for COVID-19, you should also stay home and away from others for at least 24 hours after your fever is gone and symptoms are improving.    Talk to Your Healthcare Provider    If you test positive for COVID-19, notify your healthcare provider to discuss potential treatment options. Patients at high-risk for severe illness due to advanced age or chronic health conditions may be candidates for outpatient antiviral treatment or monoclonal antibody  therapy. These treatments, when available and appropriate, should be given as soon as possible after diagnosis.      Seek Further Care      If you are awaiting test results or are confirmed positive for COVID-19, and your symptoms worsen at home with symptoms such as: extreme weakness, difficult breathing or unrelenting fevers greater than 100.4° F, you should contact your healthcare provider or the emergency department before arriving for care. Measures to keep everyone safe in this difficult time are changing frequently. Your healthcare provider can help direct you on next steps.      If you have not been exposed or are not aware of an exposure to COVID-19 and are concerned about your symptoms, please contact your healthcare provider with any questions.     10 Ways to Manage Your Health at Home       1. Stay home from work, school, and other public places. If you must go out, avoid using any kind of public transportation, ridesharing, or taxis.   2. Carefully monitor your symptoms. If your symptoms get worse, call your healthcare provider immediately.  3. Get rest and stay hydrated.   4. If you have a medical appointment, call the healthcare provider ahead of time and tell them that you have or may have COVID-19.  5. For medical emergencies, call 911 and notify the dispatch personnel that you have or may have COVID-19.   6. Cover your cough and sneezes.   7. Wash your hands often with soap and water for at least 20 seconds, or clean your hands with an alcohol-based hand  that contains at least 60% alcohol.   8. As much as possible, stay in a specific room and away from other people in your home. You should use a separate bathroom, if available. If you need to be around other people in or outside of the home, wear a facemask.   9. Avoid sharing personal items with other people in your household, like dishes, towels, and bedding.   10. Clean all surfaces that are touched often, like counters, tabletops, and  doorknobs. Use household cleaning sprays or wipes according to the label instructions.         Post-Discharge Follow-up  Please call your primary care provider within two days of your discharge to arrange for a telehealth follow-up. CDC does not recommend repeat testing after a positive test.    For Those in Close Contact with Someone with COVID-19  Anyone who has been in close contact with someone who has COVID-19 should follow CDC guidelines for quarantine (below).     What counts as close contact?    * You were within six feet of someone who has COVID-19 for at least 15 minutes.  * You provided care at home to someone who is sick with COVID-19.  * You had direct physical contact with the person (touched, hugged, or kissed them).  * You shared eating or drinking utensils.  * They sneezed, coughed, or somehow got respiratory droplets on you.    CDC Guidelines: If you were exposed to someone with COVID-19 (Quarantine)   If you:  Have been boosted  OR  Completed the primary series of Pfizer or Moderna vaccine within the last six months  OR   Completed the primary series of J&J vaccine within the last two months   ? Wear a mask around others for 10 days    ? Test on day five, if possible    If you develop symptoms get a test and stay home.   If you:  Completed the primary series of Pfizer or Moderna vaccine over six months ago and are not boosted  OR  Completed the primary series of J&J over two months ago and are not boosted  OR   Are unvaccinated   ? Stay home for five days. Wear a mask around others for five additional days    ? Test on day five if possible    If you develop symptoms get a test and stay home.     Additional Information      You can also get more information at the following websites:   Centers for Disease Control & Prevention (CDC)  What to do if you are sick with coronavirus disease 2019, https://www.cdc.gov/coronavirus/2019-ncov/downloads/sick-with-2019-nCoV-fact-sheet.pdf  Centers for Disease  Control & Prevention (CDC)  10 things you can do to manage your health at home, https://www.cdc.gov/coronavirus/2019-ncov/downloads/10Things.pdf  http://www.[facility]/topics-services/diseases-and-conditions/diseases-a-z-list/coronavirus/symptoms-treatment  http://www.[facility]/topics-services/diseases-[email or web address]/diseases-a-z-list/coronavirus  https://www.cdc.gov/coronavirus/2019-ncov/